# Patient Record
Sex: FEMALE | Race: WHITE | NOT HISPANIC OR LATINO | Employment: FULL TIME | URBAN - METROPOLITAN AREA
[De-identification: names, ages, dates, MRNs, and addresses within clinical notes are randomized per-mention and may not be internally consistent; named-entity substitution may affect disease eponyms.]

---

## 2017-03-27 ENCOUNTER — ALLSCRIPTS OFFICE VISIT (OUTPATIENT)
Dept: OTHER | Facility: OTHER | Age: 47
End: 2017-03-27

## 2017-03-27 ENCOUNTER — LAB REQUISITION (OUTPATIENT)
Dept: LAB | Facility: HOSPITAL | Age: 47
End: 2017-03-27

## 2017-03-27 DIAGNOSIS — Z01.419 ENCOUNTER FOR GYNECOLOGICAL EXAMINATION WITHOUT ABNORMAL FINDING: ICD-10-CM

## 2017-03-27 PROCEDURE — G0145 SCR C/V CYTO,THINLAYER,RESCR: HCPCS | Performed by: OBSTETRICS & GYNECOLOGY

## 2017-03-30 DIAGNOSIS — Z12.31 ENCOUNTER FOR SCREENING MAMMOGRAM FOR MALIGNANT NEOPLASM OF BREAST: ICD-10-CM

## 2017-04-04 LAB
LAB AP GYN PRIMARY INTERPRETATION: NORMAL
LAB AP LMP: NORMAL
Lab: NORMAL

## 2017-04-24 ENCOUNTER — GENERIC CONVERSION - ENCOUNTER (OUTPATIENT)
Dept: OTHER | Facility: OTHER | Age: 47
End: 2017-04-24

## 2017-05-08 ENCOUNTER — HOSPITAL ENCOUNTER (OUTPATIENT)
Dept: RADIOLOGY | Age: 47
Discharge: HOME | End: 2017-05-08
Payer: COMMERCIAL

## 2017-05-08 DIAGNOSIS — Z12.31 ENCOUNTER FOR SCREENING MAMMOGRAM FOR MALIGNANT NEOPLASM OF BREAST: ICD-10-CM

## 2017-05-08 PROCEDURE — G0202 SCR MAMMO BI INCL CAD: HCPCS

## 2018-01-13 VITALS
HEIGHT: 66 IN | WEIGHT: 156.13 LBS | SYSTOLIC BLOOD PRESSURE: 116 MMHG | DIASTOLIC BLOOD PRESSURE: 70 MMHG | BODY MASS INDEX: 25.09 KG/M2

## 2018-03-28 ENCOUNTER — ANNUAL EXAM (OUTPATIENT)
Dept: OBGYN CLINIC | Facility: CLINIC | Age: 48
End: 2018-03-28
Payer: COMMERCIAL

## 2018-03-28 VITALS
WEIGHT: 160.4 LBS | DIASTOLIC BLOOD PRESSURE: 68 MMHG | SYSTOLIC BLOOD PRESSURE: 102 MMHG | BODY MASS INDEX: 25.78 KG/M2 | HEIGHT: 66 IN

## 2018-03-28 DIAGNOSIS — Z01.419 ENCOUNTER FOR GYNECOLOGICAL EXAMINATION WITHOUT ABNORMAL FINDING: Primary | ICD-10-CM

## 2018-03-28 DIAGNOSIS — Z12.31 VISIT FOR SCREENING MAMMOGRAM: ICD-10-CM

## 2018-03-28 DIAGNOSIS — Z01.419 PAP SMEAR, AS PART OF ROUTINE GYNECOLOGICAL EXAMINATION: ICD-10-CM

## 2018-03-28 PROCEDURE — G0145 SCR C/V CYTO,THINLAYER,RESCR: HCPCS | Performed by: PHYSICIAN ASSISTANT

## 2018-03-28 PROCEDURE — 87624 HPV HI-RISK TYP POOLED RSLT: CPT | Performed by: PHYSICIAN ASSISTANT

## 2018-03-28 PROCEDURE — S0612 ANNUAL GYNECOLOGICAL EXAMINA: HCPCS | Performed by: PHYSICIAN ASSISTANT

## 2018-03-28 RX ORDER — PNV NO.121/IRON/FOLIC ACID 28MG-0.8MG
TABLET ORAL
COMMUNITY
End: 2018-04-16 | Stop reason: ALTCHOICE

## 2018-03-28 NOTE — PROGRESS NOTES
Assessment/Plan:    No problem-specific Assessment & Plan notes found for this encounter  Diagnoses and all orders for this visit:    Encounter for gynecological examination without abnormal finding    Pap smear, as part of routine gynecological examination  -     Liquid-based pap, screening    Visit for screening mammogram  -     Mammo screening bilateral w cad; Future    Other orders  -     Cetirizine HCl (ZYRTEC ALLERGY) 10 MG CAPS; Take by mouth  -     Prenatal Vit-Fe Fumarate-FA (KP PRENATAL MULTIVITAMINS) 28-0 8 MG TABS; Take by mouth  -     Evening Primrose Oil 1000 MG CAPS; Take by mouth      Pap done  Patient given slip for mammogram   Stressed the importance of monthly self-breast exam   Patient to call if periods become irregular, or she starts to experience severe perimenopausal symptoms  If no problems, patient to return in 1 year for routine gyn care  Subjective:      Patient ID: Bertha Jeffries is a 52 y o  female  Patient here today for yearly gyn exam   States she is doing well overall  Periods are regular every 24-27 days, and bleeding lasts for 5 days  Patient denies any heavy bleeding, bad cramping, headache, and mood symptoms  Still taking evening Primrose oil for PMS symptoms, which definitely helps  Has noticed occasional urinary urgency, but no issues with urine leakage  She denies any perimenopausal symptoms  Patient also denies any change in bowel or bladder habits, pelvic pain, bloating, abdominal pain, nausea and vomiting, change in appetite, and thyroid disease  Patient due for mammogram in May  She is performing self-breast exams sporadically  Denies any new masses, skin changes, nipple discharge, and pain and tenderness          The following portions of the patient's history were reviewed and updated as appropriate: allergies, current medications, past family history, past medical history, past social history, past surgical history and problem list     Review of Systems   Constitutional: Negative for appetite change and unexpected weight change  Cardiovascular:        No masses, skin changes, nipple discharge, and pain/tenderness  Gastrointestinal: Negative for abdominal distention, abdominal pain, constipation, diarrhea, nausea and vomiting  Genitourinary: Positive for urgency (Occasional)  Negative for difficulty urinating, dysuria, frequency, genital sores, hematuria, menstrual problem, pelvic pain, vaginal bleeding, vaginal discharge and vaginal pain  Objective:      /68 (BP Location: Right arm, Patient Position: Sitting, Cuff Size: Standard)   Ht 5' 6" (1 676 m)   Wt 72 8 kg (160 lb 6 4 oz)   LMP 03/09/2018 (Exact Date)   BMI 25 89 kg/m²          Physical Exam   Constitutional: She is oriented to person, place, and time  Vital signs are normal  She appears well-developed and well-nourished  Neck: No thyromegaly present  Cardiovascular: Normal rate, regular rhythm and normal heart sounds  Exam reveals no gallop and no friction rub  No murmur heard  Pulmonary/Chest: Effort normal and breath sounds normal  Right breast exhibits no inverted nipple, no mass, no nipple discharge, no skin change and no tenderness  Left breast exhibits no inverted nipple, no mass, no nipple discharge, no skin change and no tenderness  Breasts are symmetrical    Abdominal: Soft  Normal appearance and bowel sounds are normal  She exhibits no distension  There is no tenderness  Genitourinary: Vagina normal and uterus normal  No breast swelling, tenderness, discharge or bleeding  No labial fusion  There is no rash, tenderness, lesion or injury on the right labia  There is no rash, tenderness, lesion or injury on the left labia  Cervix exhibits no motion tenderness, no discharge and no friability  Right adnexum displays no mass, no tenderness and no fullness  Left adnexum displays no mass, no tenderness and no fullness   No erythema, tenderness or bleeding in the vagina  No vaginal discharge found  Lymphadenopathy:     She has no cervical adenopathy  Right: No inguinal adenopathy present  Left: No inguinal adenopathy present  Neurological: She is alert and oriented to person, place, and time  Skin: Skin is warm and dry  Psychiatric: She has a normal mood and affect  Her behavior is normal  Judgment and thought content normal    Vitals reviewed

## 2018-04-02 LAB — HPV RRNA GENITAL QL NAA+PROBE: NORMAL

## 2018-04-03 LAB
LAB AP GYN PRIMARY INTERPRETATION: NORMAL
LAB AP LMP: NORMAL
Lab: NORMAL

## 2018-04-16 ENCOUNTER — OFFICE VISIT (OUTPATIENT)
Dept: FAMILY MEDICINE CLINIC | Facility: CLINIC | Age: 48
End: 2018-04-16
Payer: COMMERCIAL

## 2018-04-16 VITALS
DIASTOLIC BLOOD PRESSURE: 80 MMHG | HEIGHT: 66 IN | HEART RATE: 68 BPM | WEIGHT: 159.4 LBS | BODY MASS INDEX: 25.62 KG/M2 | SYSTOLIC BLOOD PRESSURE: 132 MMHG | RESPIRATION RATE: 16 BRPM

## 2018-04-16 DIAGNOSIS — E78.2 MIXED HYPERLIPIDEMIA: Primary | ICD-10-CM

## 2018-04-16 DIAGNOSIS — Z79.899 LONG-TERM USE OF HIGH-RISK MEDICATION: ICD-10-CM

## 2018-04-16 DIAGNOSIS — Z23 NEED FOR DIPHTHERIA-TETANUS-PERTUSSIS (TDAP) VACCINE: ICD-10-CM

## 2018-04-16 PROBLEM — Z00.00 HEALTH CARE MAINTENANCE: Status: ACTIVE | Noted: 2018-04-16

## 2018-04-16 PROCEDURE — 90471 IMMUNIZATION ADMIN: CPT

## 2018-04-16 PROCEDURE — 90715 TDAP VACCINE 7 YRS/> IM: CPT

## 2018-04-16 PROCEDURE — 99396 PREV VISIT EST AGE 40-64: CPT | Performed by: FAMILY MEDICINE

## 2018-04-16 RX ORDER — EPINEPHRINE 0.3 MG/.3ML
INJECTION SUBCUTANEOUS
COMMUNITY
Start: 2018-03-28

## 2018-04-16 NOTE — PROGRESS NOTES
ASSESSMENT/PLAN:         Health Maintenance   Topic Date Due    HIV SCREENING  1970    DTaP,Tdap,and Td Vaccines (1 - Tdap) 1991    INFLUENZA VACCINE  2017    MAMMOGRAM  2018    Depression Screening PHQ-9  2019    PAP SMEAR  2020         Problem List as of 2018 Reviewed: 2018  2:19 PM by Deepali Garcia DO    Allergic arthritis            Subjective:   Chief Complaint   Patient presents with    Np to be established for a pe     Patient is here to establish herself in to have a physical   She is overall healthy person without any real problems  She follows with gyn as well  The only thing she has noticed is that over time and especially with being indio menopausal she can lose weight overall sometimes has fatigue  She has a history of high cholesterol in the past   Sometimes she gets a little lightheaded but more like vertigo but it is fleeting  patient ID: Quentin Gomez is a 52 y o  female  No past medical history on file    Past Surgical History:   Procedure Laterality Date     SECTION      DILATION AND CURETTAGE OF UTERUS      WISDOM TOOTH EXTRACTION       Family History   Problem Relation Age of Onset    Diabetes Mother     Diabetes Brother     Breast cancer Maternal Aunt     Osteoporosis Maternal Aunt     Alcohol abuse Father     Bipolar disorder Sister      Social History   Substance Use Topics    Smoking status: Former Smoker    Smokeless tobacco: Never Used    Alcohol use Yes      Comment: Socially     History   Smoking Status    Former Smoker   Smokeless Tobacco    Never Used        MED LIST WAS REVIEWED AND UPDATED       ROS    Constitutional  No fever chills no fatigue no weight loss no weight gain    Mental status  No anxiety or depression and no sleep disturbances no changes in personality or emotional problems no suicidal or homicidal ideations    Eyes  No eye pain no red eyes no visual disturbances no discharge no eye itch    ENT  No earache no hearing loss nasal discharge no sore throat no hoarseness no postnasal drip     Cardio  No chest pain no palpitations no leg edema no claudication no dyspnea on exertion no nocturnal dyspnea    Respiratory  No shortness of breath or wheeze no cough no orthopnea no dyspnea on exertion no hemoptysis no sputum production    GI  No abdominal pain no nausea no vomiting no diarrhea or constipation no bloody stools no change in bowel habits no change in weight      no dysuria hematuria no pyuria no incontinence no pelvic pain    Musculoskeletal  No myalgias arthralgias no joint swelling or stiffness no limb pain or swelling    Skin  No rashes or lesions no itchiness and no wounds    Neuro  No headache dizziness lightheadedness syncope numbness paresthesias or confusion    Heme  No swollen glands no easy bruising          Objective:    Radiology (if applicable):  No orders to display        VITALS:  Wt Readings from Last 3 Encounters:   04/16/18 72 3 kg (159 lb 6 4 oz)   03/28/18 72 8 kg (160 lb 6 4 oz)   03/27/17 70 8 kg (156 lb 2 1 oz)     BP Readings from Last 3 Encounters:   04/16/18 132/80   03/28/18 102/68   03/27/17 116/70     Pulse Readings from Last 3 Encounters:   04/16/18 68   05/01/15 72   04/11/13 74     Body mass index is 25 92 kg/m²      Laboratory Results:   Lab Results   Component Value Date    WBC 7 49 05/02/2015    HGB 13 2 05/02/2015    HCT 39 5 05/02/2015    MCV 91 05/02/2015     05/02/2015     Lab Results   Component Value Date     05/02/2015    K 3 9 05/02/2015     05/02/2015    CO2 29 05/02/2015    BUN 11 05/02/2015     Lab Results   Component Value Date    GLUCOSE 85 05/02/2015    ALT 25 05/02/2015    AST 18 05/02/2015    ALKPHOS 36 (L) 05/02/2015    CALCIUM 8 8 05/02/2015     No results found for: TSH    Lipid Profile:   Lab Results   Component Value Date    CHOL 180 05/02/2015     Lab Results   Component Value Date    HDL 74 05/02/2015     Lab Results   Component Value Date    LDLCALC 100 05/02/2015     Lab Results   Component Value Date    TRIG 31 05/02/2015       Diabetic labs (if applicable)  No results found for: HGBA1C  Lab Results   Component Value Date    LDLCALC 100 05/02/2015    CREATININE 0 51 (L) 05/02/2015          Physical Exam      Gen  No acute distress well-appearing well-nourished appears stated age    Mental status  Good judgment and insight oriented to time person and place, recent and remote memory intact mood and affect normal cooperative and patient is reasonable    HEENT  PERRLA 3 mm, EOMI without nystagmus, TMs clear, turbinates open pink no exudate, pharynx benign, tongue midline    Neck   supple no masses trachea midline positive click normal carotid upstrokes with no bruits    Cor  Regular rhythm without ectopy or murmur, no S3-S4, normal palpation that is no heave lift or thrill    Vascular  No edema, good pedal pulses    Lungs  CTA bilaterally in no respiratory distress no wheezes rhonchi or rales, normal to palpation no tactile fremitus    Abdomen  Soft, no palpable masses, no hepatosplenomegaly, normal bowel sounds, nontender    Lymphatics  No palpable nodes in the neck, supraclavicular area, axilla, or groin     Musculoskeletal  No clubbing cyanosis or edema muscle tone normal    Skin  no rashes or abnormal appearing lesions    Neuro  Normal ambulation, cranial nerves 2-12 grossly intact, higher functioning with reasoning intact

## 2018-04-16 NOTE — PATIENT INSTRUCTIONS
1   Your very healthy annual past labs look good  2  You have a lab slip and I will send you a post card with the results that they are normal and you could always look these up on My Chart    3  Follow up with gyn  4    Recheck as needed or once every 1-2 years at your age

## 2018-04-23 ENCOUNTER — TELEPHONE (OUTPATIENT)
Dept: OBGYN CLINIC | Facility: CLINIC | Age: 48
End: 2018-04-23

## 2018-06-11 ENCOUNTER — TELEPHONE (OUTPATIENT)
Dept: FAMILY MEDICINE CLINIC | Facility: CLINIC | Age: 48
End: 2018-06-11

## 2018-06-11 NOTE — TELEPHONE ENCOUNTER
1  Please call patient and tell her that the  Date on the thyroid was probably a wrong click on my part in getting them altogether was fine  2  In order to check her cholesterol I had to use that diagnosis because she told me she had higher cholesterol in the past     3  Long-term use of drugs is just a code to use to get her rest of her blood work done  Any over-the-counter medication counts  It does not mean she is on high-risk medication, all medication needs to come out through the kidney or the liver and this is the coating need to use to get these organs checked

## 2018-06-11 NOTE — TELEPHONE ENCOUNTER
Pt was in to see you and you ordered Blood work for her  She got home and realized that the "expected" date was different on her TSH blood work  It said it "expected date 10/16/2018"  All the others were expected dates of 7/16/18  Also, she was wondering about the Diagnosis on the TSH:  "Long-term use of high-risk medication"    And the DiaGnosis on the Lipid: "Mixed Hyperlipidemia"    I suggested she download the [employer] elisabet so she can run these questions past you directly ;-)    Best number for Maddy Ruiz:  [phone]

## 2018-10-31 ENCOUNTER — HOSPITAL ENCOUNTER (OUTPATIENT)
Dept: RADIOLOGY | Age: 48
Discharge: HOME | End: 2018-10-31
Payer: COMMERCIAL

## 2018-10-31 DIAGNOSIS — Z12.31 VISIT FOR SCREENING MAMMOGRAM: ICD-10-CM

## 2018-10-31 PROCEDURE — 77067 SCR MAMMO BI INCL CAD: CPT

## 2018-11-02 ENCOUNTER — TELEPHONE (OUTPATIENT)
Dept: OBGYN CLINIC | Facility: CLINIC | Age: 48
End: 2018-11-02

## 2018-11-02 NOTE — TELEPHONE ENCOUNTER
Spoke with patient regarding mammogram report  New asymmetry seen in R breast, thought to be summation artifact  Patient needs further imaging  Has appointment scheduled for 11/5

## 2018-11-05 ENCOUNTER — HOSPITAL ENCOUNTER (OUTPATIENT)
Dept: MAMMOGRAPHY | Facility: CLINIC | Age: 48
Discharge: HOME | End: 2018-11-05
Payer: COMMERCIAL

## 2018-11-05 ENCOUNTER — HOSPITAL ENCOUNTER (OUTPATIENT)
Dept: ULTRASOUND IMAGING | Facility: CLINIC | Age: 48
Discharge: HOME | End: 2018-11-05
Payer: COMMERCIAL

## 2018-11-05 DIAGNOSIS — R92.8 ABNORMAL MAMMOGRAM: ICD-10-CM

## 2018-11-05 PROCEDURE — G0279 TOMOSYNTHESIS, MAMMO: HCPCS

## 2018-11-05 PROCEDURE — 76642 ULTRASOUND BREAST LIMITED: CPT

## 2018-11-05 PROCEDURE — 77065 DX MAMMO INCL CAD UNI: CPT

## 2018-11-07 ENCOUNTER — TELEPHONE (OUTPATIENT)
Dept: OBGYN CLINIC | Facility: CLINIC | Age: 48
End: 2018-11-07

## 2018-11-07 NOTE — TELEPHONE ENCOUNTER
Spoke with patient regarding f/u R breast imaging  No abnormality seen on R breast diagnostic mammogram or U/S; overall negative  Can return to yearly mammogram screening

## 2019-04-01 ENCOUNTER — ANNUAL EXAM (OUTPATIENT)
Dept: OBGYN CLINIC | Facility: CLINIC | Age: 49
End: 2019-04-01
Payer: COMMERCIAL

## 2019-04-01 VITALS — DIASTOLIC BLOOD PRESSURE: 62 MMHG | WEIGHT: 162.4 LBS | BODY MASS INDEX: 26.41 KG/M2 | SYSTOLIC BLOOD PRESSURE: 110 MMHG

## 2019-04-01 DIAGNOSIS — Z01.419 PAP SMEAR, AS PART OF ROUTINE GYNECOLOGICAL EXAMINATION: Primary | ICD-10-CM

## 2019-04-01 DIAGNOSIS — Z01.419 ENCOUNTER FOR GYNECOLOGICAL EXAMINATION: ICD-10-CM

## 2019-04-01 DIAGNOSIS — Z12.39 BREAST CANCER SCREENING: ICD-10-CM

## 2019-04-01 PROCEDURE — S0612 ANNUAL GYNECOLOGICAL EXAMINA: HCPCS | Performed by: OBSTETRICS & GYNECOLOGY

## 2019-04-02 LAB
CLINICAL INFO: NORMAL
CYTO CVX: NORMAL
DATE PREVIOUS BX: NORMAL
LMP START DATE: NORMAL
SL AMB PREV. PAP:: NORMAL
SPECIMEN SOURCE CVX/VAG CYTO: NORMAL

## 2020-03-16 ENCOUNTER — TELEPHONE (OUTPATIENT)
Dept: PSYCHIATRY | Facility: CLINIC | Age: 50
End: 2020-03-16

## 2020-03-17 ENCOUNTER — TELEPHONE (OUTPATIENT)
Dept: PSYCHIATRY | Facility: CLINIC | Age: 50
End: 2020-03-17

## 2020-03-17 NOTE — TELEPHONE ENCOUNTER
Behavorial Health Outpatient Intake Questions    Referred by: Parent support group     Please advised interviewee that they need to answer all questions truthfully to allow for best care and any misrepresentations of information may affect their ability to be seen at this clinic   => Was this discussed? Yes     Behavorial Health Outpatient Intake History -     Presenting Problem (in patient's words): Patient needs someone to talk to - her son has addiction and mental health issues. Wants to prevent her mental health from progressing into Depression and Anxiety. Has the patient ever seen or is currently seeing a psychiatrist? No   If yes who/when? If seen as outpatient, have they been seen here (and by whom)? If not seen here, which provider(s) did the patient see and for how long? Has the patient ever seen or currently see a therapist? Yes If yes who/when? Back when she was 19. Then family therapy with her son. Has a member of the patient's family been in therapy here? No  If yes, with whom? Has the patient been hospitalized for mental health? No   If yes, how long ago was last hospitalization and where was it? Substance Abuse:No concerns of substance abuse are reported. Does the patient have ICM or CTT? No    Is the patient taking injectable psychiatric medications? No    => If yes, patient cannot be seen here. Communications  Are there any developmental disabilities? No    Does the patient have hearing impairment? No       History-    Has the patient served in the [address] [place] [address] [employer]? No    If yes, have you had combat services? No    Was the patient activated into federal active duty as a member of the [employer] or reserve? No    Legal History-     Does the patient have any history of arrests, [facility] time, or DUIs? No  If Yes-  1) What types of charges? 2) When were they last incarcerated? 3) Are they currently on parole or probation?     Minor Child-    Who has custody of the child? N/A    Is there a custody agreement? N/A    If there is a custody agreement remind parent that they must bring a copy to the first appt or they will not be seen. Intake Team, please check with provider before scheduling if flags come up such as:  - complex case  - legal history (other than DUI)  - communication barrier concerns are present  - if, in your judgment, this needs further review    ACCEPTED as a patient Yes  => Appointment Date: Thursday, May 21st @ 9:00AM w/ Krishna Alfaro     Referred Elsewhere? No    Name of Insurance Co: [facility] [facility]/ [address] [facility] [place] ID# [id number]  Insurance Phone #  If ins is primary or secondary  If patient is a minor, parents information such as Name, D. O.B of guarantor.

## 2020-03-18 ENCOUNTER — TELEPHONE (OUTPATIENT)
Dept: PSYCHIATRY | Facility: CLINIC | Age: 50
End: 2020-03-18

## 2020-04-29 ENCOUNTER — TELEPHONE (OUTPATIENT)
Dept: FAMILY MEDICINE CLINIC | Facility: CLINIC | Age: 50
End: 2020-04-29

## 2020-05-21 ENCOUNTER — TELEMEDICINE (OUTPATIENT)
Dept: BEHAVIORAL/MENTAL HEALTH CLINIC | Facility: CLINIC | Age: 50
End: 2020-05-21
Payer: COMMERCIAL

## 2020-05-21 DIAGNOSIS — F43.29 ADJUSTMENT DISORDER WITH DISTURBANCE OF EMOTION: Primary | ICD-10-CM

## 2020-05-21 PROCEDURE — 90837 PSYTX W PT 60 MINUTES: CPT | Performed by: SOCIAL WORKER

## 2020-06-08 ENCOUNTER — TELEMEDICINE (OUTPATIENT)
Dept: BEHAVIORAL/MENTAL HEALTH CLINIC | Facility: CLINIC | Age: 50
End: 2020-06-08
Payer: COMMERCIAL

## 2020-06-08 DIAGNOSIS — F43.23 ADJUSTMENT DISORDER WITH MIXED ANXIETY AND DEPRESSED MOOD: Primary | ICD-10-CM

## 2020-06-08 PROCEDURE — 90834 PSYTX W PT 45 MINUTES: CPT | Performed by: SOCIAL WORKER

## 2020-07-22 ENCOUNTER — TELEMEDICINE (OUTPATIENT)
Dept: BEHAVIORAL/MENTAL HEALTH CLINIC | Facility: CLINIC | Age: 50
End: 2020-07-22
Payer: COMMERCIAL

## 2020-07-22 DIAGNOSIS — F43.23 ADJUSTMENT DISORDER WITH MIXED ANXIETY AND DEPRESSED MOOD: Primary | ICD-10-CM

## 2020-07-22 PROCEDURE — 90834 PSYTX W PT 45 MINUTES: CPT | Performed by: SOCIAL WORKER

## 2020-07-22 NOTE — PSYCH
Virtual Regular Visit      Assessment/Plan:    Problem List Items Addressed This Visit        Other    Adjustment disorder with mixed anxiety and depressed mood - Primary               Reason for visit is due to covid 19     Encounter provider Nga Lopez    Provider located at [address] [facility]  [address]  [place] [address]      Recent Visits  No visits were found meeting these conditions  Showing recent visits within past 7 days and meeting all other requirements     Today's Visits  Date Type Provider Dept   [address] [facility] Therapist   Showing today's visits and meeting all other requirements     Future Appointments  No visits were found meeting these conditions  Showing future appointments within next 150 days and meeting all other requirements        The patient was identified by name and date of birth  Rosario Mcleod was informed that this is a telemedicine visit and that the visit is being conducted through [employer]  My office door was closed  No one else was in the room  She acknowledged consent and understanding of privacy and security of the video platform  The patient has agreed to participate and understands they can discontinue the visit at any time  Patient is aware this is a billable service  Subjective  Rosario Mcleod is a 52 y o  female    HPI     No past medical history on file      Past Surgical History:   Procedure Laterality Date     SECTION      DILATION AND CURETTAGE OF UTERUS      WISDOM TOOTH EXTRACTION         Current Outpatient Medications   Medication Sig Dispense Refill    Cetirizine HCl (ZYRTEC ALLERGY) 10 MG CAPS Take by mouth      Cetirizine-Pseudoephedrine (ZYRTEC-D PO) Zyrtec   PRN      EPINEPHrine (EPIPEN) 0 3 mg/0 3 mL SOAJ       Evening Primrose Oil 1000 MG CAPS Take by mouth      EVENING PRIMROSE OIL PO evening primrose oil       No current facility-administered medications for this visit  Allergies   Allergen Reactions    Latex Rash    Other Allergic Rhinitis     Seasonal also eczema       Review of Systems  Data: Met with Maia Bolden virtually  She turns 50 tomorrow  She shared she always does for others  She shared she needs to find out what she wants and then she needs to do it  She spent a large part of her session discussing her 25year old son who recently completed drug and alcohol rehab at [facility] and now is in one of their [address] houses  She shared he is not returning back to their home  She is not sure if he has bought into everything he has learned or it is due to the lockdown but she shared he is still there which is a plus  She discussed his job opportunity has been delayed due to covid  They will help him with this  She went into a lengthy detailed background about his life threatening food allergies and she believes the anxiety from this has also added to his anxiety and his poor choices  His drugs of choice were marijuana, alcohol and meth  She discussed the various support groups she is in along with seeing the undersigned and she will join [employer]  She believes their son will finally let them do some family counseling  She believes her  Scott Bonilla and her have done everything for their son and have spent quite a bit of money  She said however her  has a different take and expectations and we discussed how dad's are more event oriented and fix it mom's process and nuture more  She is in a parents support meeting thru [employer] and is in a group on Transform[employer] boundaries  We discussed her goals of working to increase her self confidence, dealing with some of the things she continues to suppress but only when she is ready and her working on her goal and plan to be the best she can be  Assessment: Maia Bolden is glad her son is still in treatment   She feels her and her  see things about their son differently but that they usually meet some where overall in agreement concerning the son  She denies suicidal and homicidal ideation, plan or intent  She turns 50 tomorrow and is upset she cant have the celebration she would like to have due to covid  Carlitos Diamond wants to now focus on what she needs and wants to do for her  Plan: Will continue to meet virtually thru the pandemic to help her progress on her goals  Video Exam    There were no vitals filed for this visit  Physical Exam N/A    I spent 45 minutes directly with the patient during this visit      VIRTUAL VISIT DISCLAIMER    Kofi Toure acknowledges that she has consented to an online visit or consultation  She understands that the online visit is based solely on information provided by her, and that, in the absence of a face-to-face physical evaluation by the physician, the diagnosis she receives is both limited and provisional in terms of accuracy and completeness  This is not intended to replace a full medical face-to-face evaluation by the physician  Kofi Toure understands and accepts these terms

## 2020-08-03 ENCOUNTER — TELEMEDICINE (OUTPATIENT)
Dept: BEHAVIORAL/MENTAL HEALTH CLINIC | Facility: CLINIC | Age: 50
End: 2020-08-03
Payer: COMMERCIAL

## 2020-08-03 DIAGNOSIS — F43.23 ADJUSTMENT DISORDER WITH MIXED ANXIETY AND DEPRESSED MOOD: Primary | ICD-10-CM

## 2020-08-03 PROCEDURE — 90834 PSYTX W PT 45 MINUTES: CPT | Performed by: SOCIAL WORKER
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
## 2020-09-09 ENCOUNTER — ANNUAL EXAM (OUTPATIENT)
Dept: OBGYN CLINIC | Facility: CLINIC | Age: 50
End: 2020-09-09
Payer: COMMERCIAL

## 2020-09-09 VITALS
BODY MASS INDEX: 25.46 KG/M2 | SYSTOLIC BLOOD PRESSURE: 110 MMHG | DIASTOLIC BLOOD PRESSURE: 80 MMHG | WEIGHT: 158.4 LBS | TEMPERATURE: 97.5 F | HEIGHT: 66 IN

## 2020-09-09 DIAGNOSIS — Z01.419 ENCOUNTER FOR GYNECOLOGICAL EXAMINATION WITHOUT ABNORMAL FINDING: Primary | ICD-10-CM

## 2020-09-09 DIAGNOSIS — Z12.39 BREAST CANCER SCREENING: ICD-10-CM

## 2020-09-09 DIAGNOSIS — Z12.11 ENCOUNTER FOR SCREENING COLONOSCOPY: ICD-10-CM

## 2020-09-09 PROBLEM — Z00.00 HEALTH CARE MAINTENANCE: Status: RESOLVED | Noted: 2018-04-16 | Resolved: 2020-09-09

## 2020-09-09 PROCEDURE — G0145 SCR C/V CYTO,THINLAYER,RESCR: HCPCS | Performed by: PHYSICIAN ASSISTANT

## 2020-09-09 PROCEDURE — S0612 ANNUAL GYNECOLOGICAL EXAMINA: HCPCS | Performed by: PHYSICIAN ASSISTANT

## 2020-09-09 NOTE — PROGRESS NOTES
Assessment/Plan:    No problem-specific Assessment & Plan notes found for this encounter  Diagnoses and all orders for this visit:    Encounter for gynecological examination without abnormal finding  -     Liquid-based pap, screening    Breast cancer screening  -     Mammo screening bilateral w 3d & cad; Future    Encounter for screening colonoscopy  -     Ambulatory referral to Gastroenterology; Future    Other orders  -     Cancel: Liquid-based pap, screening        Pap done  Order for mammogram entered  Discussed menopause with patient  Average age is 46  While still possible, it is extremely unlikely patient could become pregnant at this time  No hormonal testing available to predict when periods will stop  Can refer to women's health PT if urge incontinence worsens  Referral to GI entered; stressed the need for colonoscopy screening  If no problems, patient to return in 1 year for routine gyn care  Subjective:      Patient ID: Rene Harris is a 48 y o  female  Patient is here for yearly gyn exam   States she is doing well overall  Periods are regular every 24 days, and bleeding lasts for 5 days  She denies heavy bleeding and severe cramping  Notes an increase in bowel frequency and mood swings around the time of her cycle  No other perimenopausal complaints  Still has concerns regarding fertility  Complains of occasional urge incontinence  Denies pelvic pain, bloating, abdominal pain, n/v, change in appetite, and thyroid disease  Due for first colonoscopy  Patient is overdue for a mammogram   She is performing self-breast exam   Denies new masses, skin changes, nipple discharge, and pain/tenderness        The following portions of the patient's history were reviewed and updated as appropriate: allergies, current medications, past family history, past medical history, past social history, past surgical history and problem list     Review of Systems   Constitutional: Negative for appetite change and unexpected weight change  Cardiovascular:        No masses, skin changes, nipple discharge, and pain/tenderness  Gastrointestinal: Negative for abdominal distention, abdominal pain, constipation, diarrhea, nausea and vomiting  Genitourinary: Negative for difficulty urinating, dysuria, frequency, genital sores, hematuria, menstrual problem, pelvic pain, urgency, vaginal bleeding, vaginal discharge and vaginal pain  Urge incontinence         Objective:      /80   Temp 97 5 °F (36 4 °C)   Ht 5' 6" (1 676 m)   Wt 71 8 kg (158 lb 6 4 oz)   LMP 08/26/2020   BMI 25 57 kg/m²          Physical Exam  Vitals signs reviewed  Exam conducted with a chaperone present  Constitutional:       Appearance: Normal appearance  She is well-developed and normal weight  Neck:      Musculoskeletal: Neck supple  Thyroid: No thyromegaly  Pulmonary:      Effort: Pulmonary effort is normal    Chest:      Breasts: Breasts are symmetrical          Right: Normal  No swelling, bleeding, inverted nipple, mass, nipple discharge, skin change or tenderness  Left: Normal  No swelling, bleeding, inverted nipple, mass, nipple discharge, skin change or tenderness  Abdominal:      General: Abdomen is flat  There is no distension  Palpations: Abdomen is soft  Tenderness: There is no abdominal tenderness  Genitourinary:     General: Normal vulva  Pubic Area: No rash  Labia:         Right: No rash, tenderness, lesion or injury  Left: No rash, tenderness, lesion or injury  Vagina: Normal  No vaginal discharge, erythema, tenderness or bleeding  Cervix: Normal       Uterus: Normal        Adnexa: Right adnexa normal and left adnexa normal         Right: No mass, tenderness or fullness  Left: No mass, tenderness or fullness  Lymphadenopathy:      Cervical: No cervical adenopathy        Upper Body:      Right upper body: No supraclavicular or axillary adenopathy  Left upper body: No supraclavicular or axillary adenopathy  Lower Body: No right inguinal adenopathy  No left inguinal adenopathy  Skin:     General: Skin is warm and dry  Neurological:      Mental Status: She is alert and oriented to person, place, and time  Psychiatric:         Mood and Affect: Mood normal          Behavior: Behavior normal  Behavior is cooperative  Thought Content:  Thought content normal          Judgment: Judgment normal

## 2020-09-15 LAB
LAB AP GYN PRIMARY INTERPRETATION: NORMAL
Lab: NORMAL

## 2020-10-01 ENCOUNTER — TELEMEDICINE (OUTPATIENT)
Dept: BEHAVIORAL/MENTAL HEALTH CLINIC | Facility: CLINIC | Age: 50
End: 2020-10-01
Payer: COMMERCIAL

## 2020-10-01 DIAGNOSIS — F43.23 ADJUSTMENT DISORDER WITH MIXED ANXIETY AND DEPRESSED MOOD: Primary | ICD-10-CM

## 2020-10-01 PROCEDURE — 90834 PSYTX W PT 45 MINUTES: CPT | Performed by: SOCIAL WORKER

## 2020-10-27 ENCOUNTER — TELEPHONE (OUTPATIENT)
Dept: GASTROENTEROLOGY | Facility: CLINIC | Age: 50
End: 2020-10-27

## 2020-10-28 ENCOUNTER — TELEMEDICINE (OUTPATIENT)
Dept: BEHAVIORAL/MENTAL HEALTH CLINIC | Facility: CLINIC | Age: 50
End: 2020-10-28
Payer: COMMERCIAL

## 2020-10-28 DIAGNOSIS — F43.23 ADJUSTMENT DISORDER WITH MIXED ANXIETY AND DEPRESSED MOOD: Primary | ICD-10-CM

## 2020-10-28 PROCEDURE — 90834 PSYTX W PT 45 MINUTES: CPT | Performed by: SOCIAL WORKER

## 2020-11-18 ENCOUNTER — TELEMEDICINE (OUTPATIENT)
Dept: BEHAVIORAL/MENTAL HEALTH CLINIC | Facility: CLINIC | Age: 50
End: 2020-11-18
Payer: COMMERCIAL

## 2020-11-18 DIAGNOSIS — F43.23 ADJUSTMENT DISORDER WITH MIXED ANXIETY AND DEPRESSED MOOD: ICD-10-CM

## 2020-11-18 PROCEDURE — 90834 PSYTX W PT 45 MINUTES: CPT | Performed by: SOCIAL WORKER

## 2020-12-09 DIAGNOSIS — Z12.39 BREAST CANCER SCREENING: ICD-10-CM

## 2021-01-04 NOTE — TELEPHONE ENCOUNTER
Spoke to patient who calls to ask how she should manage a problem she found in her genital area yesterday  She noticed a pea sized lump externally to vaginal opening; tender with palpation  She would like to know if she can manage it on her own first instead of coming in for a visit  Advised that she may initiate hot compresses with topical antibiotic ointment to follow three times daily for about one week  Advised to come in for a visit if it either gets larger and more painful or if it does not resolve  Patient agrees to plan  There are no Wet Read(s) to document.

## 2021-01-14 ENCOUNTER — SOCIAL WORK (OUTPATIENT)
Dept: BEHAVIORAL/MENTAL HEALTH CLINIC | Facility: CLINIC | Age: 51
End: 2021-01-14
Payer: COMMERCIAL

## 2021-01-14 DIAGNOSIS — F43.23 ADJUSTMENT DISORDER WITH MIXED ANXIETY AND DEPRESSED MOOD: ICD-10-CM

## 2021-01-14 PROCEDURE — 90834 PSYTX W PT 45 MINUTES: CPT | Performed by: SOCIAL WORKER

## 2021-01-14 NOTE — BH TREATMENT PLAN
Wilder Metzger    Please note: The original treatment plan was done on 5/21/20  The undersigned was erroneously doing the plans every 4 months instead of the required 6 months  Therefore the plan was not due on 9/21 but it was due on 11/21/20  However the client has not been her since before 11/21 hence why it is being done today the first session since before 11/21 1970       Date of Initial Treatment Plan: 5/21/2020  Date of Current Treatment Plan: 01/14/21    Treatment Plan Number update    Strengths/Personal Resources for Self Care: strong values, caring, supportive, encouraging and positive  Diagnosis:   1  Adjustment disorder with mixed anxiety and depressed mood         Area of Needs: see below      Long Term Goal 1: I still need to increase my self confidence  Target Date: 07/14/2021  Completion Date: TBD         Short Term Objectives for Goal 1: will work in therapy and do my homework outside of sessions in confidence building strategies  Long Term Goal 2: I need to decrease my social anxiety  Target Date: 07/14/2021  Completion Date: TBD    Short Term Objectives for Goal 2: Mindfulness, and CBT strategies         Long Term Goal # 3: I still need to let go of worrying about the things I cant control     Target Date: 07/14/2021  Completion Date: TBD    Short Term Objectives for Goal 3: Mindfulness and solution focused psychotherapy    GOAL 1: Modality: Individual 2x per month   Completion Date tbd    GOAL 2: Modality: Individual 2x per month   Completion Date tbd     GOAL 3: Modality: Individual 2x per month   Completion Date tbd      Behavioral Health Treatment Plan [facility]: Diagnosis and Treatment Plan explained to Brock Raw relates understanding diagnosis and is agreeable to Treatment Plan  Client Comments : Please share your thoughts, feelings, need and/or experiences regarding your treatment plan:  The undersigned and the client will work as a team to help Martine Fofana progress on her goals

## 2021-01-14 NOTE — PSYCH
Psychotherapy Provided: Individual Psychotherapy 45 minutes   This note was not shared with the patient due to this is a psychotherapy note  Length of time in session: 45 minutes, follow up in 2 week    Goals addressed in session: Goal 1, Goal 2 and Goal 3      Pain:      moderate to severe    0    Current suicide risk : Low     Data: I am trying to focus on what is more important  I am working on my social anxiety  I question whether I should speak up in situations and want to be more confident about this  The holidays were great and I am reading the 7 habits of highly effective people by [facility] that you recommended and it has helped  She discussed her goals of being more self confident, less socially anxious, worrying about what she has control of and what she does not  Fredousman Telles is also talking about things she never did  She wants to be the best she can be  Assessment: She is not suicidal or homicidal and she feels the holidays went well  Her addicted son is doing well  We continue to work on mindfulness, distress tolerance and on CBT strategies to help her progress on her goals  Plan: Continue to help her skill build in distress tolerance  Behavioral Health Treatment Plan [facility]: Diagnosis and Treatment Plan explained to Kathia Fabio relates understanding diagnosis and is agreeable to Treatment Plan   Yes

## 2021-02-05 ENCOUNTER — TELEMEDICINE (OUTPATIENT)
Dept: BEHAVIORAL/MENTAL HEALTH CLINIC | Facility: CLINIC | Age: 51
End: 2021-02-05
Payer: COMMERCIAL

## 2021-02-05 DIAGNOSIS — F43.23 ADJUSTMENT DISORDER WITH MIXED ANXIETY AND DEPRESSED MOOD: ICD-10-CM

## 2021-02-05 PROCEDURE — 90834 PSYTX W PT 45 MINUTES: CPT | Performed by: SOCIAL WORKER

## 2021-02-05 NOTE — PSYCH
This note was not shared with the patient due to this is a psychotherapy note  Virtual Regular Visit      Assessment/Plan:    Problem List Items Addressed This Visit        Other    Adjustment disorder with mixed anxiety and depressed mood               Reason for visit is due to ongoing symptoms and the covid 19 pandemic     Encounter provider Mohinder Lawton    Provider located at [address] [place] [id number] [phone]      Recent Visits  No visits were found meeting these conditions  Showing recent visits within past 7 days and meeting all other requirements     Today's Visits  Date Type Provider Dept   [address] [facility] Therapist [facility]   Showing today's visits and meeting all other requirements     Future Appointments  No visits were found meeting these conditions  Showing future appointments within next 150 days and meeting all other requirements        The patient was identified by name and date of birth  Shanel Cohen was informed that this is a telemedicine visit and that the visit is being conducted through [employer] and patient was informed that this is a secure, HIPAA-compliant platform  She agrees to proceed     My office door was closed  No one else was in the room  She acknowledged consent and understanding of privacy and security of the video platform  The patient has agreed to participate and understands they can discontinue the visit at any time  Patient is aware this is a billable service  Subjective  Shanel Cohen is a 48 y o  female    HPI     No past medical history on file      Past Surgical History:   Procedure Laterality Date     SECTION      DILATION AND CURETTAGE OF UTERUS      WISDOM TOOTH EXTRACTION         Current Outpatient Medications   Medication Sig Dispense Refill    Cetirizine-Pseudoephedrine (ZYRTEC-D PO) Zyrtec   PRN      EPINEPHrine (EPIPEN) 0 3 mg/0 3 mL SOAJ       Evening Primrose Oil 1000 MG CAPS Take by mouth       No current facility-administered medications for this visit  Allergies   Allergen Reactions    Latex Rash    Other Allergic Rhinitis     Seasonal also eczema       Review of Systems Data:Today Steven Lay and the undersigned did an overview of her goals  We discussed the strategies she is using to increase and sustain her self confidence  We discussed how she is trying to decrease her social anxiety  Regarding goal 3 Steven Lay shared how she is becoming more effective at recognizing what she can control and what she has no control over and accepting this  Assessment: Steven Lay denies suicidal and homicidal ideation, plan or intent  She does feel that she is progressing on her goals as we discuss mindfulness and CBT strategies  Plan: Continue to help her skill build in distress tolerance  Video Exam    There were no vitals filed for this visit  Physical Exam N/A    I spent 45 minutes directly with the patient during this visit      VIRTUAL VISIT DISCLAIMER    Marjie Canavan acknowledges that she has consented to an online visit or consultation  She understands that the online visit is based solely on information provided by her, and that, in the absence of a face-to-face physical evaluation by the physician, the diagnosis she receives is both limited and provisional in terms of accuracy and completeness  This is not intended to replace a full medical face-to-face evaluation by the physician  Marjie Canavan understands and accepts these terms

## 2021-02-24 ENCOUNTER — SOCIAL WORK (OUTPATIENT)
Dept: BEHAVIORAL/MENTAL HEALTH CLINIC | Facility: CLINIC | Age: 51
End: 2021-02-24
Payer: COMMERCIAL

## 2021-02-24 DIAGNOSIS — F43.23 ADJUSTMENT DISORDER WITH MIXED ANXIETY AND DEPRESSED MOOD: ICD-10-CM

## 2021-02-24 DIAGNOSIS — F40.10 SOCIAL ANXIETY DISORDER: Primary | ICD-10-CM

## 2021-02-24 PROCEDURE — 90834 PSYTX W PT 45 MINUTES: CPT | Performed by: SOCIAL WORKER

## 2021-02-24 NOTE — PSYCH
Psychotherapy Provided: Individual Psychotherapy 45 minutes   This note was not shared with the patient due to this is a psychotherapy note  Length of time in session: 45 minutes, follow up in 2 week    Goals addressed in session: Goal 1, Goal 2 and Goal 3      Pain:      moderate to severe    0    Current suicide risk : Low     Data:Lorrie arrived for her appointment  She discussed her recovery plan and she discussed the things she is doing to help increase her self confidence  She discussed she is expecting her second grandchild  She discussed how she is trying to be supportive of her brother who is recently  and has health issues  She continues to read self improvement literature  She also discussed how she is recreating during this difficult time  Regarding her social anxiety we discussed strategies to deal with this  She continues to attend a parents group virtually for people who are dealing with addicted children  Her addicted son is still in program and she wants him to get back to his individual therapist  Steven Lay is making progress on her third goal of learning what she can control and what she can't  This is true especially in regard to her addicted son and his early recovery  She shared she wants to delve deeper into her own family of origin issues next session  She also asked and received some advice on how to handle current issues concerning her brother  Assessment: Steven Lay denies suicidal/homicidal ideation, plan or intent  For the most part she feels she is doing well  However she continues to be most stressed about her recovering son and now her brother who is delaying getting a test needed for a diagnosis concerning a medical issue that needs evaluation  We continue to work on mindfulness and distress tolerance  Plan: We will delve into her family of origin issues next session       Behavioral Health Treatment Plan [facility]: Diagnosis and Treatment Plan explained to Tony Najera relates understanding diagnosis and is agreeable to Treatment Plan   Yes

## 2021-03-29 ENCOUNTER — SOCIAL WORK (OUTPATIENT)
Dept: BEHAVIORAL/MENTAL HEALTH CLINIC | Facility: CLINIC | Age: 51
End: 2021-03-29
Payer: COMMERCIAL

## 2021-03-29 DIAGNOSIS — F43.23 ADJUSTMENT DISORDER WITH MIXED ANXIETY AND DEPRESSED MOOD: ICD-10-CM

## 2021-03-29 PROCEDURE — 90834 PSYTX W PT 45 MINUTES: CPT | Performed by: SOCIAL WORKER

## 2021-03-29 NOTE — PSYCH
Psychotherapy Provided: Individual Psychotherapy 45 minutes   This note was not shared with the patient due to this is a psychotherapy note  Length of time in session: 45 minutes, follow up in 2 week    Goals addressed in session: Goal 1, Goal 2 and Goal 3      Pain:      moderate to severe    0    Current suicide risk : Low     Data:Lorrie shared her son is one year sober  He is at a sober living house up near [place]  Her grandson was also just born  The remainder of the session was us working on her 3 goals  We worked on increasing her self confidence, decreasing her social anxiety and on helping her to accept the fact most things are not in her control and accepting that  Assessment: Provencal Lung denies self harm thoughts or thoughts to harm others  She remains anxious and she still has issues with self confidence and social anxiety  We continue to work on mindfulness and CBT strategies  Plan: Continue to help Provencal Lung skill build in distress tolerance  Behavioral Health Treatment Plan [facility]: Diagnosis and Treatment Plan explained to Venancio Kossuth relates understanding diagnosis and is agreeable to Treatment Plan   Yes

## 2021-04-08 ENCOUNTER — OFFICE VISIT (OUTPATIENT)
Dept: FAMILY MEDICINE CLINIC | Facility: CLINIC | Age: 51
End: 2021-04-08
Payer: COMMERCIAL

## 2021-04-08 VITALS
RESPIRATION RATE: 17 BRPM | HEIGHT: 66 IN | HEART RATE: 74 BPM | TEMPERATURE: 98.2 F | BODY MASS INDEX: 25.39 KG/M2 | SYSTOLIC BLOOD PRESSURE: 122 MMHG | DIASTOLIC BLOOD PRESSURE: 76 MMHG | WEIGHT: 158 LBS

## 2021-04-08 DIAGNOSIS — Z12.11 ENCOUNTER FOR SCREENING COLONOSCOPY: ICD-10-CM

## 2021-04-08 DIAGNOSIS — Z00.00 ROUTINE GENERAL MEDICAL EXAMINATION AT A HEALTH CARE FACILITY: Primary | ICD-10-CM

## 2021-04-08 DIAGNOSIS — Z79.899 ENCOUNTER FOR LONG-TERM (CURRENT) USE OF MEDICATIONS: ICD-10-CM

## 2021-04-08 PROCEDURE — 3725F SCREEN DEPRESSION PERFORMED: CPT | Performed by: FAMILY MEDICINE

## 2021-04-08 PROCEDURE — 1036F TOBACCO NON-USER: CPT | Performed by: FAMILY MEDICINE

## 2021-04-08 PROCEDURE — 99396 PREV VISIT EST AGE 40-64: CPT | Performed by: FAMILY MEDICINE

## 2021-04-08 PROCEDURE — 3008F BODY MASS INDEX DOCD: CPT | Performed by: FAMILY MEDICINE

## 2021-04-08 RX ORDER — CHLORAL HYDRATE 500 MG
1000 CAPSULE ORAL DAILY
COMMUNITY

## 2021-04-08 NOTE — PATIENT INSTRUCTIONS
1  Remember to get your mammogram in September    2  Please schedule colonoscopy with Dr Moses Franco or 1 of his associates    3  We will call you with the blood work results    4   Still consider the shingles vaccine as well as the COVID vaccine    We will see you any your 2 or whenever you need us

## 2021-04-08 NOTE — PROGRESS NOTES
SUBJECTIVE:-------------------------------------------------------------------------------------------    Harley Eisenmenger is a 48 y o   female and is here for routine health maintenance  Health Maintenance   Topic Date Due    HIV Screening  Never done    COVID-19 Vaccine (1) Never done    BMI: Followup Plan  Never done    Annual Physical  04/16/2019    Colorectal Cancer Screening  Never done    Influenza Vaccine (1) 09/01/2020    MAMMOGRAM  11/25/2021    BMI: Adult  04/08/2022    Cervical Cancer Screening  09/09/2022    DTaP,Tdap,and Td Vaccines (2 - Td) 04/16/2028    Pneumococcal Vaccine: Pediatrics (0 to 5 Years) and At-Risk Patients (6 to 59 Years)  Aged Out    HIB Vaccine  Aged Out    Hepatitis B Vaccine  Aged Out    IPV Vaccine  Aged Out    Hepatitis A Vaccine  Aged Out    Meningococcal ACWY Vaccine  Aged Out    HPV Vaccine  Aged [employer] History   Administered Date(s) Administered    Influenza, seasonal, injectable 1970    Tdap 04/16/2018    Tuberculin Skin Test-PPD Intradermal 05/01/2015         Diet and Physical Activity  Diet: well balanced diet  Body mass index is 25 5 kg/m²    Exercise: daily      General Health  Hearing:  Is normal  Vision: sees ophthalmologist/optometrist every few years  Dental:  Sees dentist every 6 months      Smoker no        ASSESSMENT/PLAN:-------------------------------------------------------------------------------------------    Patient's physical is up-to-date   Immunizations; patient will consider Shingrix and will wait regarding COVID  Cancer screenings ; patient given information regarding colonoscopy and her mammogram isn't due until September 2021      Patient instructed on exercise  Patient instructed on healthy choices for diet       NEXT PHYSICAL 1 YEAR    Patient will get blood work and we will call her with results  Otherwise she is good for couple years or as needed    If any knee problems, pt will call us for a referral to [facility]        The following portions of the patient's history were reviewed and updated as appropriate: allergies, current medications, past family history, past medical history, past social history, past surgical history and problem list       OBJECTIVE:---------------------------------------------------------------------------------------------------    /76 (BP Location: Left arm, Patient Position: Sitting, Cuff Size: Standard)   Pulse 74   Temp 98 2 °F (36 8 °C) (Oral)   Resp 17   Ht 5' 6" (1 676 m)   Wt 71 7 kg (158 lb)   BMI 25 50 kg/m²   Wt Readings from Last 3 Encounters:   04/08/21 71 7 kg (158 lb)   09/09/20 71 8 kg (158 lb 6 4 oz)   04/01/19 73 7 kg (162 lb 6 4 oz)     BP Readings from Last 3 Encounters:   04/08/21 122/76   09/09/20 110/80   04/01/19 110/62     Pulse Readings from Last 3 Encounters:   04/08/21 74   04/16/18 68   05/01/15 72     Body mass index is 25 5 kg/m²  Health Maintenance   Topic Date Due    HIV Screening  Never done    COVID-19 Vaccine (1) Never done    BMI: Followup Plan  Never done    Annual Physical  04/16/2019    Colorectal Cancer Screening  Never done    Influenza Vaccine (1) 09/01/2020    MAMMOGRAM  11/25/2021    BMI: Adult  04/08/2022    Cervical Cancer Screening  09/09/2022    DTaP,Tdap,and Td Vaccines (2 - Td) 04/16/2028    Pneumococcal Vaccine: Pediatrics (0 to 5 Years) and At-Risk Patients (6 to 59 Years)  Aged Out    HIB Vaccine  Aged Out    Hepatitis B Vaccine  Aged Out    IPV Vaccine  Aged Out    Hepatitis A Vaccine  Aged Out    Meningococcal ACWY Vaccine  Aged Out    HPV Vaccine  Aged Out       ROS:   12 point review of systems negative            PHYSICAL EXAM:    Gen    No acute distress well-appearing well-nourished appears stated age    Mental status  Good judgment and insight oriented to time person and place, recent and remote memory intact mood and affect normal cooperative and patient is reasonable    HEENT  PERRLA 3 mm, EOMI without nystagmus, TMs clear, turbinates open pink no exudate, pharynx benign, tongue midline    Neck   supple no masses trachea midline positive click normal carotid upstrokes with no bruits    Cor  Regular rhythm without ectopy or murmur, no S3-S4, normal palpation that is no heave lift or thrill    Vascular  No edema, good pedal pulses    Lungs  CTA bilaterally in no respiratory distress no wheezes rhonchi or rales, normal to palpation no tactile fremitus    Abdomen  Soft, no palpable masses, no hepatosplenomegaly, normal bowel sounds, nontender    Lymphatics  No palpable nodes in the neck, supraclavicular area, axilla, or groin     Musculoskeletal  No clubbing cyanosis or edema muscle tone normal 12 point review of systems is negative    Skin  no rashes or abnormal appearing lesions    Neuro  Normal ambulation, cranial nerves 2-12 grossly intact, higher functioning with reasoning intact

## 2021-04-09 ENCOUNTER — TELEPHONE (OUTPATIENT)
Dept: PSYCHIATRY | Facility: CLINIC | Age: 51
End: 2021-04-09

## 2021-04-09 NOTE — TELEPHONE ENCOUNTER
Returned Lorrie's call regarding making more appointments with Dung Corrales  I left my name and number in message requesting she give me a call back

## 2021-04-15 ENCOUNTER — TELEPHONE (OUTPATIENT)
Dept: FAMILY MEDICINE CLINIC | Facility: CLINIC | Age: 51
End: 2021-04-15

## 2021-04-15 NOTE — TELEPHONE ENCOUNTER
----- Message from Darylene Scotland, DO sent at 4/14/2021 12:24 PM EDT -----  Please call patient regarding her blood work  Her cholesterol is elevated at 246 normal is 200  Her LDL is 161 it should be less than 100  Decreasing cheese ice-cream and fatty cuts of beef, pork, lunch meats will help this to go down    Everything else is normal  We will check it next year again      Patient informed

## 2021-04-19 ENCOUNTER — SOCIAL WORK (OUTPATIENT)
Dept: BEHAVIORAL/MENTAL HEALTH CLINIC | Facility: CLINIC | Age: 51
End: 2021-04-19
Payer: COMMERCIAL

## 2021-04-19 DIAGNOSIS — F43.23 ADJUSTMENT DISORDER WITH MIXED ANXIETY AND DEPRESSED MOOD: ICD-10-CM

## 2021-04-19 PROCEDURE — 90834 PSYTX W PT 45 MINUTES: CPT | Performed by: SOCIAL WORKER

## 2021-04-19 NOTE — PSYCH
Psychotherapy Provided: Individual Psychotherapy 45 minutes   This note was not shared with the patient due to this is a psychotherapy note  Length of time in session: 45 minutes, follow up in 45 week    Goals addressed in session: Goal 1, Goal 2 and Goal 3      Pain:      moderate to severe    0    Current suicide risk : Low     Data:Today Maia Bolden arrived for her session  She discussed how she is going to join an adult child of alcoholic group at an agency called [employer]  She discussed how she is focusing on her no  We discussed her goals of learning and accepting what she cant control and what she can, working on developing her self confidence and her self esteem  We also work on decreasing her social anxiety  Assessment: Maia Bolden denies suicidal/homicidal ideation, plan or intent  She however struggles with her social anxiety, and self confidence  We continue to work on mindfulness and distress tolerance  Plan: Continue to skill build in distress tolerance  Behavioral Health Treatment Plan [facility]: Diagnosis and Treatment Plan explained to Esperanzandrichard Robert relates understanding diagnosis and is agreeable to Treatment Plan   Yes

## 2021-04-30 ENCOUNTER — TELEPHONE (OUTPATIENT)
Dept: OBGYN CLINIC | Facility: CLINIC | Age: 51
End: 2021-04-30

## 2021-05-04 ENCOUNTER — TELEPHONE (OUTPATIENT)
Dept: OBGYN CLINIC | Facility: CLINIC | Age: 51
End: 2021-05-04

## 2021-05-04 DIAGNOSIS — N39.3 STRESS INCONTINENCE: Primary | ICD-10-CM

## 2021-05-04 NOTE — TELEPHONE ENCOUNTER
Spoke with patient regarding stress incontinence  States it is still episodic, but becoming more frequent  Would like PT evaluation  Referral entered; patient given [employer]  She will call for appointment  Call with further questions

## 2021-05-17 ENCOUNTER — SOCIAL WORK (OUTPATIENT)
Dept: BEHAVIORAL/MENTAL HEALTH CLINIC | Facility: CLINIC | Age: 51
End: 2021-05-17
Payer: COMMERCIAL

## 2021-05-17 DIAGNOSIS — F43.23 ADJUSTMENT DISORDER WITH MIXED ANXIETY AND DEPRESSED MOOD: Primary | ICD-10-CM

## 2021-05-17 PROCEDURE — 90834 PSYTX W PT 45 MINUTES: CPT | Performed by: SOCIAL WORKER

## 2021-05-17 NOTE — PSYCH
Psychotherapy Provided: Individual Psychotherapy 45 minutes   This note was not shared with the patient due to this is a psychotherapy note  Length of time in session: 45 minutes, follow up in 2 week    Adjustment disorder with depression and anxiety    Goals addressed in session: Goal 1, Goal 2 and Goal 3      Pain:      moderate to severe    0    Current suicide risk : Low     Data: Omer Nava discussed how she is trying to increase her self confidence and self esteem  She also gave examples of how she is working on in her words getting over her social anxiety  The biggest gain is related to her third goal of learning to accept what she can control and what she can't  She related this concerning her addicted son who is at a sober living house  She discussed also her goals in relation to her work  Assessment: Omer Nava denies thoughts of self harm or harm to others  She feels thru mindfulness and CBT discussed during her sessions she is making progress on her goals  Plan: Continue to help her progress on her goals  Behavioral Health Treatment Plan [facility]: Diagnosis and Treatment Plan explained to Elie Vanessa relates understanding diagnosis and is agreeable to Treatment Plan   Yes

## 2021-06-28 ENCOUNTER — EVALUATION (OUTPATIENT)
Dept: PHYSICAL THERAPY | Facility: REHABILITATION | Age: 51
End: 2021-06-28
Payer: COMMERCIAL

## 2021-06-28 DIAGNOSIS — N39.3 STRESS INCONTINENCE: ICD-10-CM

## 2021-06-28 DIAGNOSIS — N81.89 PELVIC FLOOR WEAKNESS IN FEMALE: Primary | ICD-10-CM

## 2021-06-28 PROCEDURE — 97162 PT EVAL MOD COMPLEX 30 MIN: CPT | Performed by: PHYSICAL THERAPIST

## 2021-06-28 PROCEDURE — 97530 THERAPEUTIC ACTIVITIES: CPT | Performed by: PHYSICAL THERAPIST

## 2021-06-28 NOTE — PROGRESS NOTES
PT Evaluation     Today's date: 2021  Patient name: Oswaldo Bailey  : 1970  MRN: [id number]  Referring provider: ANDRE Bell  Dx:   Encounter Diagnosis     ICD-10-CM    1  Pelvic floor weakness in female  N81 89    2  Stress incontinence  N39 3 Ambulatory referral to Physical Therapy                  Assessment  Assessment details: Oswaldo Bailey is a 48 y o  female who presents with concerns of intermittent urge> stress incontinence  Patient presents with the below outlined deficits and is appropriate for skilled physical therapy in order to address deficits and ultimately meet goal of independent self management of condition  Therapeutic activities performed upon examination included education regarding pelvic floor anatomy, explanation of exam technique, explanation of exam findings and discussion of treatment plan as well as expectations of the patient to emphasize the importance of compliance and adherence to physical therapy visits  Educated patient on avoiding defensive voids and she expressed good understanding  Impairments: abnormal muscle tone, activity intolerance, impaired physical strength, lacks appropriate home exercise program and poor body mechanics  Understanding of Dx/Px/POC: good   Prognosis: good    Goals  STGs to be met in 4 weeks:  * Patient will be compliant with introductory HEP as prescribed  * Presents with improved understanding of bladder irritants in diet and makes concerted effort to reduce them by at least 50-75%  LTGs to be met by discharge:  * Patient will present with a 60 % improvement on FOTO score by discharge to indicate improved symptom management  * Patient reports that she is able to successfully suppress an urge to empty her bladder for 20' without leakage  * Normalize sEMG findings to indicate strength average > 12uV and resting average < 2 5uV     * Presents with improved fluid intake to avoid concentrated and irritating urine by discharge  * Patient implements urge suppression strategies throughout the day and reports that her average voiding interval is 2+ hours upon discharge  * Patient will be compliant with comprehensive home exercise program for self management of condition  Plan  Patient would benefit from: skilled physical therapy  Referral necessary: No  Planned modality interventions: biofeedback  Planned therapy interventions: manual therapy, neuromuscular re-education, patient education, strengthening, stretching, therapeutic activities, therapeutic exercise and home exercise program  Frequency: 1x week  Duration in weeks: 12  Plan of Care beginning date: 6/28/2021  Plan of Care expiration date: 9/20/2021  Treatment plan discussed with: patient        PT Pelvic Floor Subjective:   History of Present Illness:   Patient reports that she has increased urinary urgency over the past few years  Inability to suppress her urine urge is episodic and intermittent without patient seeing a pattern or relation to behavior  Sometimes can suppress urge to empty bladder for long periods and other times she cannot suppress for 10 seconds           Recurrent probem    Quality of life: good    Social Support:     Lives with:  Spouse    Relationship status: /committed    Work status: employed full time ( - [employer] work)  Diet and Exercise:    Diet:balanced nutrition    Walking - daily,   Yoga - 6/7 days Yoga with Judy Barakat  OB/ gyn History    Gestational History:     Prior Pregnancy: Yes      Number of prior pregnancies: 3    Number of term pregnancies: 3    Delivery Type: vaginal delivery      Delivery Complications:  Ages 22, 25 and 23 - last one was a ceaserean section     Menstrual History:      Menstrual irregularities irregular menses    Painful periods:  No difficulty managing menstrual pain    Tolerates tampons: no    Birth control method: condoms  Bladder Function:     Voiding Difficulties positive for: urgency and frequent urination      Voiding Difficulties comments:     Voiding frequency: every 3-4 hours and every 1-2 hours    Urinary leakage: urine leakage    Urinary leakage aggravated by: coughing, sneezing (sometimes), walking to the bathroom, hearing running water, key-in-the-door syndrome and seeing a toilet    Urinary leakage not aggravated by: exercise and anxiety    Nocturia (episodes per night): 0    Painful urination: No      Intake (ounces): Water intake (oz): 80  Coffee intake (oz): 36  Alcohol intake (oz): wine or beer - varies  Intake (ounces) comment: Public toilet - "hovers" over toilet seat  Bowel Function:     Bowel frequency: daily    [place] Stool Scale: type 4 and type 3  Sexual Function:     Sexually Active:  Sexually active    Pain during intercourse: No    Pain:     No pain reported by patient  Diagnostic Tests:     None    Treatments:     None    Patient Goals:     Patient goals for therapy:  Improved quality of life, return to sport/leisure activities, improved comfort, improved bladder or bowel function and fully empty bladder or bowels      Objective   Pelvic Floor Exam   Position: supine exam    General Perineum Exam:   perineum intact  General perineum exam comments: No discharge, irritation, organ prolapse or skin breakdown evident      Visual Inspection of Perineum:   Excursion of perineal body in cephalad direction with contraction of pelvic floor muscles (PFM): weak  Excursion of perineal body in caudal direction with relaxation of pelvic floor muscles (PFM): weak  Involuntary contraction with coughing: yes  Involuntary relaxation with bearing down: yes  Cotton swab test: non-tender  Cough reflex: cough reflex  Sphincter Tone Resting: normal  Sphincter Tone Squeeze: normal  Sensation: intact    Pelvic Organ Prolapse   no pelvic organ prolapse    Pelvic Floor Muscle Exam     Breathing pattern with contraction: holding breath  Pelvic floor muscle relaxation is complete  PERFECT Score   Power right: 3/5  Power left: 3/5  Endurance (seconds to max): 3  Repetitions (before fatigue): 4    SMEG Biofeedback   to be assessed next treatment               Precautions: standard      Manuals 6/28                                                                Neuro Re-Ed             NMR via BF                                                                                           Ther Ex                                                                                                                     Ther Activity                                       Gait Training                                       Modalities

## 2021-07-02 ENCOUNTER — TELEPHONE (OUTPATIENT)
Dept: PSYCHIATRY | Facility: CLINIC | Age: 51
End: 2021-07-02

## 2021-07-02 NOTE — TELEPHONE ENCOUNTER
Patient called to apologize regarding today's missed appointment  She states it completely slipped her mind and was very apologetic   R/s'd to 7/6/21 at 2pm

## 2021-07-06 ENCOUNTER — TELEPHONE (OUTPATIENT)
Dept: PSYCHIATRY | Facility: CLINIC | Age: 51
End: 2021-07-06

## 2021-07-06 ENCOUNTER — SOCIAL WORK (OUTPATIENT)
Dept: BEHAVIORAL/MENTAL HEALTH CLINIC | Facility: CLINIC | Age: 51
End: 2021-07-06
Payer: COMMERCIAL

## 2021-07-06 DIAGNOSIS — F43.23 ADJUSTMENT DISORDER WITH MIXED ANXIETY AND DEPRESSED MOOD: ICD-10-CM

## 2021-07-06 PROCEDURE — 90834 PSYTX W PT 45 MINUTES: CPT | Performed by: SOCIAL WORKER

## 2021-07-06 NOTE — BH TREATMENT PLAN
Francisca Yanelis  1970       Date of Initial Treatment Plan: 05/21/2020                         Due to IT signature pad issues and due to covid restrictions the undersigned and the client verbally signed off on the plan  Date of Current Treatment Plan: 07/06/21    Treatment Plan Number update     Strengths/Personal Resources for Self Care: strong values,caring,supportive,encouraging and positive    Diagnosis:   Adjustment disorder with depression and anxiety, social anxiety    Area of Needs: Please see below      Long Term Goal 1: I need to develop more self confidence    Target Date: 01/06/2022  Completion Date: TBD         Short Term Objectives for Goal 1: I will take social risks which will show me that no one is above me or better than me  Long Term Goal 2: I need to work on mu social anxiety    Target Date: 01/06/2022  Completion Date: TBD    Short Term Objectives for Goal 2: I will not decline invitations  Long Term Goal # 3: I need to stop worrying over what I cant control     Target Date: 01/06/2022  Completion Date: TBD    Short Term Objectives for Goal 3: Mindfulness, CBT and SFT    GOAL 1: Modality: Individual 2x per month   Completion Date tbd    GOAL 2: Modality: Individual 2x per month   Completion Date tbd     GOAL 3: Modality: Individual 2x per month   Completion Date tbd      Behavioral Health Treatment Plan [facility]: Diagnosis and Treatment Plan explained to Heber So relates understanding diagnosis and is agreeable to Treatment Plan  Client Comments : Please share your thoughts, feelings, need and/or experiences regarding your treatment plan: aSturnino Christianson and the undersigned will work as a team to help her progress on her goals

## 2021-07-06 NOTE — PSYCH
Psychotherapy Provided: Individual Psychotherapy 45 minutes   This note was not shared with the patient due to this is a psychotherapy note  Length of time in session: 45 minutes, follow up in 2 week    Adjustment with depression and anxiety, social anxiety    Goals addressed in session: Goal 1, Goal 2 and Goal 3      Pain:      moderate to severe    0    Current suicide risk : Low     Data:I am trying to be more self aware and focus more on me  I am trying to be a better person  She apologized for the no show  She is wanting to work on her self confidence and self esteem  I have lots of anxiety  She discussed how her 's OCD affects her  She discussed how her  hounds their 22year old son about something he does not want to talk about  Assessment: Deon Roberts is not suicidal or homicidal  However she remains highly anxious  We continue to work on mindfulness, CBT and SFT  Plan: Will see her after her vacation  Behavioral Health Treatment Plan [facility]: Diagnosis and Treatment Plan explained to Gustavo Chambers relates understanding diagnosis and is agreeable to Treatment Plan   Yes

## 2021-07-12 ENCOUNTER — APPOINTMENT (OUTPATIENT)
Dept: PHYSICAL THERAPY | Facility: REHABILITATION | Age: 51
End: 2021-07-12
Payer: COMMERCIAL

## 2021-07-19 ENCOUNTER — OFFICE VISIT (OUTPATIENT)
Dept: PHYSICAL THERAPY | Facility: REHABILITATION | Age: 51
End: 2021-07-19
Payer: COMMERCIAL

## 2021-07-19 DIAGNOSIS — N81.89 PELVIC FLOOR WEAKNESS IN FEMALE: ICD-10-CM

## 2021-07-19 DIAGNOSIS — N39.3 STRESS INCONTINENCE: Primary | ICD-10-CM

## 2021-07-19 PROCEDURE — 97112 NEUROMUSCULAR REEDUCATION: CPT | Performed by: PHYSICAL THERAPIST

## 2021-07-19 PROCEDURE — 97530 THERAPEUTIC ACTIVITIES: CPT | Performed by: PHYSICAL THERAPIST

## 2021-07-19 NOTE — PROGRESS NOTES
Daily Note     Today's date: 2021  Patient name: Riya Grullon  : 1970  MRN: [id number]  Referring provider: ANDRE Riley  Dx:   Encounter Diagnosis     ICD-10-CM    1  Stress incontinence  N39 3    2  Pelvic floor weakness in female  N81 89        From evaluation: Patient reports that she has increased urinary urgency over the past few years  Inability to suppress her urine urge is episodic and intermittent without patient seeing a pattern or relation to behavior  Sometimes can suppress urge to empty bladder for long periods and other times she cannot suppress for 10 seconds  Subjective: Patient hasn't had any close calls  She has been paying attention to holding her bladder longer when able  She reports that last week she had an episode of insensate FI which occurred in the morning  She was menstruating and had just returned from vacation  This was quite upsetting to her  Objective: See treatment diary below    Access Code: [email or web address]  URL: [email or web address]  Date: 2021  Prepared by: Eric Dos Santos    Exercises  Seated Pelvic Floor Contraction - 3 x daily        Assessment: Tolerated treatment well  Patient would benefit from continued PT   BF performed with handheld unit which indicates wnl resting tone in supine and sitting  In supine, she is able to engage PFM to >18 uV but in sitting, her average is around 6uV with decreased holding endurance  We discussed the importance of the core canister and added breathwork to her PFM  Issued HEP with breath and PFM in sitting for 3', 3/day  Plan: Continue per plan of care  NV - review breath and pfm  Consider further strengthening - both supine and sitting        Precautions: standard      Manuals                                                                Neuro Re-Ed             NMR via BF                                                                                           Ther Ex Ther Activity             education  Core canister and breath x10'                        Gait Training                                       Modalities             NMR via BF  45'

## 2021-07-25 NOTE — PROGRESS NOTES
Daily Note     Today's date: 2021  Patient name: Jamie Cotton  : 1970  MRN: [id number]  Referring provider: ANDRE Mckinney  Dx:   Encounter Diagnosis     ICD-10-CM    1  Stress incontinence  N39 3    2  Pelvic floor weakness in female  N81 89        From evaluation: Patient reports that she has increased urinary urgency over the past few years  Inability to suppress her urine urge is episodic and intermittent without patient seeing a pattern or relation to behavior  Sometimes can suppress urge to empty bladder for long periods and other times she cannot suppress for 10 seconds  Subjective: Patient reports semi-compliance with HEP and she got her period again after 12 days and is currently menstruating  She notes that this causes her BMs to be more frequent  She has been challenged with HEP  Objective: See treatment diary below    Access Code: [email or web address]  URL: [email or web address]  Date: 2021  Prepared by: Sudheer Seymour    Exercises  Supine Pelvic Floor Contraction - 2 x daily  Supine Hip Adduction Isometric with Ball - 2 x daily  Supine Bridge with Pelvic Floor Contraction - 2 x daily    Assessment: Tolerated treatment well  Patient would benefit from continued PT   BF performed with handheld unit which indicates wnl resting tone in supine and sitting  In supine, she is able to engage PFM to >18 uV but in sitting, her average is around 6uV with decreased holding endurance  We discussed the importance of the core canister and added breathwork to her PFM  Issued HEP with breath and PFM in sitting for 3', 3/day  Plan: Continue per plan of care  NV - review breath and pfm  Consider further strengthening - both supine and sitting        Precautions: standard      Manuals                                                               Neuro Re-Ed             NMR via BF             DB+PFM   10' Ther Ex             DB+PFM+TA   supine and sitting x10          DB+PFM+TA + hip addcution   x15          DB+PFM+TA + bridges   x15                                                                           Ther Activity             education  Core canister and breath x10'                        Gait Training                                       Modalities             NMR via BF  45'

## 2021-07-26 ENCOUNTER — OFFICE VISIT (OUTPATIENT)
Dept: PHYSICAL THERAPY | Facility: REHABILITATION | Age: 51
End: 2021-07-26
Payer: COMMERCIAL

## 2021-07-26 DIAGNOSIS — N81.89 PELVIC FLOOR WEAKNESS IN FEMALE: ICD-10-CM

## 2021-07-26 DIAGNOSIS — N39.3 STRESS INCONTINENCE: Primary | ICD-10-CM

## 2021-07-26 PROCEDURE — 97530 THERAPEUTIC ACTIVITIES: CPT | Performed by: PHYSICAL THERAPIST

## 2021-07-26 PROCEDURE — 97110 THERAPEUTIC EXERCISES: CPT | Performed by: PHYSICAL THERAPIST

## 2021-08-02 ENCOUNTER — OFFICE VISIT (OUTPATIENT)
Dept: PHYSICAL THERAPY | Facility: REHABILITATION | Age: 51
End: 2021-08-02
Payer: COMMERCIAL

## 2021-08-02 ENCOUNTER — SOCIAL WORK (OUTPATIENT)
Dept: BEHAVIORAL/MENTAL HEALTH CLINIC | Facility: CLINIC | Age: 51
End: 2021-08-02
Payer: COMMERCIAL

## 2021-08-02 DIAGNOSIS — N39.3 STRESS INCONTINENCE: Primary | ICD-10-CM

## 2021-08-02 DIAGNOSIS — F43.23 ADJUSTMENT DISORDER WITH MIXED ANXIETY AND DEPRESSED MOOD: Primary | ICD-10-CM

## 2021-08-02 DIAGNOSIS — F40.10 SOCIAL ANXIETY DISORDER: ICD-10-CM

## 2021-08-02 DIAGNOSIS — N81.89 PELVIC FLOOR WEAKNESS IN FEMALE: ICD-10-CM

## 2021-08-02 PROCEDURE — 97530 THERAPEUTIC ACTIVITIES: CPT | Performed by: PHYSICAL THERAPIST

## 2021-08-02 PROCEDURE — 90834 PSYTX W PT 45 MINUTES: CPT | Performed by: SOCIAL WORKER

## 2021-08-02 PROCEDURE — 97110 THERAPEUTIC EXERCISES: CPT | Performed by: PHYSICAL THERAPIST

## 2021-08-02 NOTE — PSYCH
Psychotherapy Provided: Individual Psychotherapy 45 minutes     Length of time in session: 45 minutes, follow up in 2 week    Social anxiety and Adjustment disorder with anxiety and depression  Goals addressed in session: Goal 1, Goal 2 and Goal 3      Pain:      moderate to severe    0    Current suicide risk : Low     Data: Freddie Rivas arrived for her session  She discussed issues she is going thru medically and sometimes her frustration with certain providers  She discussed issues with two of her sons and one situation that also involves her   They were at a shore trip that went well  She then discussed a family reunion trip with her family of origin and how she got to know some relatives on a deeper level   She also learned a lot about family members by observing  Freddie Rivas shared how her 's OCD somewhat clouded the trip but she coped with it  She discussed how her [employer] group is helping her  Assessment: Per her goals Freddie Rivas shared how her self confidence is growing, how she is interacting more with people and thereby having less social anxiety and how she is becoming better about knowing what she can control and what she can't and accepting the difference  She continues to struggle concerning society's issues  A large part of the session was her venting about a sister who has bipolar and can be quite difficult to deal with  The undersigned provided support, encouragement along with mindfulness strategies  She denies suicidal/homicidal ideation, plan or intent  Plan: Continue to help her skill build in distress tolerance and to continue to help her progress on her goals  Behavioral Health Treatment Plan [facility]: Diagnosis and Treatment Plan explained to Dee Dee Gonzalez relates understanding diagnosis and is agreeable to Treatment Plan   Yes

## 2021-08-02 NOTE — PROGRESS NOTES
Daily Note     Today's date: 2021  Patient name: Mau Finch  : 1970  MRN: [id number]  Referring provider: ANDRE Santiago  Dx:   Encounter Diagnosis     ICD-10-CM    1  Stress incontinence  N39 3    2  Pelvic floor weakness in female  N81 89        From evaluation: Patient reports that she has increased urinary urgency over the past few years  Inability to suppress her urine urge is episodic and intermittent without patient seeing a pattern or relation to behavior  Sometimes can suppress urge to empty bladder for long periods and other times she cannot suppress for 10 seconds  Subjective: Patient reports that her period has ceased  At least once daily with home execises  She is feeling less urinary urgency - she is able to pause and wait if she feels an urge without feeling as much panic  Reports that she feels it is easier to do her PFM exercises in sitting and sanding vs  Supine  Objective: See treatment diary below    Access Code: [email or web address]  URL: [email or web address]  Date: 2021  Prepared by: Anai Culver    Exercises  Seated Pelvic Floor Contraction with Isometric Hip Adduction - 2 x daily - 7 x weekly - 10 reps  Seated Shoulder Horizontal Abduction with Resistance - 2 x daily - 7 x weekly - 10 reps  Sit to Stand with Pelvic Floor Contraction - 2 x daily - 7 x weekly - 10 reps      Assessment: Tolerated treatment well  Patient would benefit from continued PT   Did well with exercises today  Pilates reformer was introduced with focus on TA+PFM and slow movements in supine  We also rogressed to more upright positioning with good subjective input from patient  She is very focused with exercises and doing well  Plan: Continue per plan of care           Precautions: standard      Manuals                                                              Neuro Re-Ed             NMR via BF             DB+PFM   10' Ther Ex             Nustep    L6 x8', seat #9         DB+PFM+TA   supine and sitting x10 reviewed         DB+PFM+TA + hip addcution   x15 seated x10         DB+PFM+TA + bridges   x15          Seated PFM + horiz abd    GTB x10         Sit to stand w/ PFM    x10         reformer    Slides, 1 Red x10              Bridges x10, 1 red                      Ther Activity             education  Core canister and breath x10'  Urge suppression x10'                      Gait Training                                       Modalities             NMR via BF  45'

## 2021-08-09 ENCOUNTER — TELEPHONE (OUTPATIENT)
Dept: PSYCHIATRY | Facility: CLINIC | Age: 51
End: 2021-08-09

## 2021-08-09 ENCOUNTER — OFFICE VISIT (OUTPATIENT)
Dept: PHYSICAL THERAPY | Facility: REHABILITATION | Age: 51
End: 2021-08-09
Payer: COMMERCIAL

## 2021-08-09 DIAGNOSIS — N81.89 PELVIC FLOOR WEAKNESS IN FEMALE: ICD-10-CM

## 2021-08-09 DIAGNOSIS — N39.3 STRESS INCONTINENCE: Primary | ICD-10-CM

## 2021-08-09 PROCEDURE — 97110 THERAPEUTIC EXERCISES: CPT

## 2021-08-09 PROCEDURE — 97112 NEUROMUSCULAR REEDUCATION: CPT

## 2021-08-09 NOTE — PROGRESS NOTES
Daily Note     Today's date: 2021  Patient name: Luis Lobo  : 1970  MRN: [id number]  Referring provider: ANDRE Don  Dx:   Encounter Diagnosis     ICD-10-CM    1  Stress incontinence  N39 3    2  Pelvic floor weakness in female  N81 89        From evaluation: Patient reports that she has increased urinary urgency over the past few years  Inability to suppress her urine urge is episodic and intermittent without patient seeing a pattern or relation to behavior  Sometimes can suppress urge to empty bladder for long periods and other times she cannot suppress for 10 seconds  Start Time: 1330  Stop Time: 1425  Total time in clinic (min): 55 minutes  Subjective: Pt reports she's noticing improvement with urgency control  Continues to be compliant with her exercises once to twice a day  Objective: See treatment diary below    Access Code: [email or web address]  URL: [email or web address]  Date: 2021  Prepared by: Marlon Vo    Exercises  Seated Pelvic Floor Contraction with Isometric Hip Adduction - 2 x daily - 7 x weekly - 10 reps  Seated Shoulder Horizontal Abduction with Resistance - 2 x daily - 7 x weekly - 10 reps  Sit to Stand with Pelvic Floor Contraction - 2 x daily - 7 x weekly - 10 reps      Assessment: Tolerated treatment well  Patient would benefit from continued PT   Pt appears to be making progress with coordination of breath and activation of muscles  Needs to be mindful when performing her exercises  With standing series, more challenged when wbing on R LE and benefits from tactile and verbal cueing to improve form  Plan: Continue per plan of care           Precautions: standard      Manuals                                                             Neuro Re-Ed             NMR via BF             DB+PFM   10'                       reformer     10' cueing                                               Ther Ex             Walker Jerica L6 x8', seat #9 L6 x 10'        DB+PFM+TA   supine and sitting x10 reviewed         DB+PFM+TA + hip addcution   x15 seated x10 1'        DB+PFM+TA + bridges   x15  1'        Seated PFM + horiz abd    GTB x10         Sit to stand w/ PFM    x10 With toe touch x10        reformer    Slides, 1 Red x10  slides 1 red x 1' bridges x 1' hip abd/add x 1'  wheelbarrow x 1' standing 1 white froward and reverse lunges (15' + 10' neuro)            Bridges x10, 1 red                      Ther Activity             education  Core canister and breath x10'  Urge suppression x10'                      Gait Training                                       Modalities             NMR via BF  45'

## 2021-08-09 NOTE — TELEPHONE ENCOUNTER
Pt called back and left message to confirm the cancellation of the 8/20 therapy appt, but requested a call back to adjust some other appointments and schedule  She can be reached at [phone]

## 2021-08-16 ENCOUNTER — APPOINTMENT (OUTPATIENT)
Dept: PHYSICAL THERAPY | Facility: REHABILITATION | Age: 51
End: 2021-08-16
Payer: COMMERCIAL

## 2021-08-19 NOTE — PROGRESS NOTES
Daily Note     Today's date: 2021  Patient name: Joshua Gomez  : 1970  MRN: [id number]  Referring provider: ANDRE Oden  Dx:   Encounter Diagnosis     ICD-10-CM    1  Stress incontinence  N39 3    2  Pelvic floor weakness in female  N81 89        From evaluation: Patient reports that she has increased urinary urgency over the past few years  Inability to suppress her urine urge is episodic and intermittent without patient seeing a pattern or relation to behavior  Sometimes can suppress urge to empty bladder for long periods and other times she cannot suppress for 10 seconds  Start Time: 0950  Stop Time: 1040  Total time in clinic (min): 50 minutes     Subjective: Pt continues to have some urgency symptoms but overall is noticing more PFM awareness  Pt is incorporating her exercises t/o the day with more functional activities  Objective: See treatment diary below    Access Code: [email or web address]  URL: [email or web address]  Date: 2021  Prepared by: Emely Mccord    Exercises  Mini Squat - 1 x daily - 7 x weekly - 3 sets - 10 reps  Forward Lunge with Rotation - 1 x daily - 7 x weekly - 3 sets - 10 reps  Lunge Matrix on Slider - 1 x daily - 7 x weekly - 3 sets - 10 reps  3-Way Lunge on Slider - 1 x daily - 7 x weekly - 3 sets - 10 reps    Bladder Matters PDF emailed to her today  Assessment: Tolerated treatment well  Patient would benefit from continued PT   Shared with pt urgency PDF handout and issued a new HEP to improve core and hip stability  Continues to be more challenged when wbing through R LE  Pt encouraged to be compliant with her HEP and to share with Simona Heimlich next week if they are beneficial or if she is unable to reproduce those exercises at home  Plan: Continue per plan of care           Precautions: standard      Manuals                                                            Neuro Re-Ed             NMR via BF             DB+PFM   10'                       reformer     10' cueing                                               Ther Ex             Nustep    L6 x8', seat #9 L6 x 10' L6 x10'       DB+PFM+TA   supine and sitting x10 reviewed         DB+PFM+TA + hip addcution   x15 seated x10 1' On reformer       DB+PFM+TA + bridges   x15  1' On reformer       Seated PFM + horiz abd    GTB x10         Sit to stand w/ PFM    x10 With toe touch x10 With toe touch x10       reformer    Slides, 1 Red x10  slides 1 red x 1' bridges x 1' hip abd/add x 1'  wheelbarrow x 1' standing 1 white froward and reverse lunges (15' + 10' neuro) supine slides 1 red/ standing hip series 1 blue  + wheelbarrel x 2  30'           Bridges x10, 1 red                      Ther Activity             education  Core canister and breath x10'  Urge suppression x10'  Bladder Matters PDF 8'                    Gait Training                                       Modalities             NMR via BF  45'

## 2021-08-23 ENCOUNTER — OFFICE VISIT (OUTPATIENT)
Dept: PHYSICAL THERAPY | Facility: REHABILITATION | Age: 51
End: 2021-08-23
Payer: COMMERCIAL

## 2021-08-23 DIAGNOSIS — N39.3 STRESS INCONTINENCE: Primary | ICD-10-CM

## 2021-08-23 DIAGNOSIS — N81.89 PELVIC FLOOR WEAKNESS IN FEMALE: ICD-10-CM

## 2021-08-23 PROCEDURE — 97110 THERAPEUTIC EXERCISES: CPT

## 2021-08-23 PROCEDURE — 97530 THERAPEUTIC ACTIVITIES: CPT

## 2021-08-29 NOTE — PROGRESS NOTES
Daily Note / Discharge    Today's date: 2021  Patient name: Lottie Grullon  : 1970  MRN: [id number]  Referring provider: ANDRE Gomez  Dx:   Encounter Diagnosis     ICD-10-CM    1  Stress incontinence  N39 3    2  Pelvic floor weakness in female  N81 89        From evaluation: Patient reports that she has increased urinary urgency over the past few years  Inability to suppress her urine urge is episodic and intermittent without patient seeing a pattern or relation to behavior  Sometimes can suppress urge to empty bladder for long periods and other times she cannot suppress for 10 seconds  Subjective: Pt is overall pleased with progress  She still has some episodes of urinary urgency but is pleased with her ability to suppress that this morning  She reports 70% improvement since [facility]  Objective: See treatment diary below      Assessment: Tolerated treatment well  Patient is appropriate for discharge to self management  She has all exercise handouts  We discussed avoiding emptying "just in case" at length and she expressed understanding  She was provided discharge instructions and recommended to reach out with any questions or concerns in the future  Goals  STGs to be met in 4 weeks:  * Patient will be compliant with introductory HEP as prescribed  MET  * Presents with improved understanding of bladder irritants in diet and makes concerted effort to reduce them by at least 50-75%  MET    LTGs to be met by discharge:  * Patient will present with a 60 % improvement on FOTO score by discharge to indicate improved symptom management  * Patient reports that she is able to successfully suppress an urge to empty her bladder for 20' without leakage  * Normalize sEMG findings to indicate strength average > 12uV and resting average < 2 5uV  NA UPON DISCHARGE   * Presents with improved fluid intake to avoid concentrated and irritating urine by discharge   MET  * Patient implements urge suppression strategies throughout the day and reports that her average voiding interval is 2+ hours upon discharge  IMPROVED BUT NOT %  * Patient will be compliant with comprehensive home exercise program for self management of condition  MET    Plan: Discharge PT           Precautions: standard      Manuals 6/28 7/19 7/26 8/2 8/9 8/23 8/30                                           Neuro Re-Ed          NMR via BF          DB+PFM   10'    10' seated/ standing             reformer     10' cueing                                   Ther Ex          Nustep    L6 x8', seat #9 L6 x 10' L6 x10' L5x8'   Lunges on sliders + PFM+breath       3 way x10' each direction   DB+PFM+TA   supine and sitting x10 reviewed      DB+PFM+TA + hip addcution   x15 seated x10 1' On reformer    DB+PFM+TA + bridges   x15  1' On reformer    Seated PFM + horiz abd    GTB x10      Sit to stand w/ PFM    x10 With toe touch x10 With toe touch x10 With TT x10   reformer    Slides, 1 Red x10  slides 1 red x 1' bridges x 1' hip abd/add x 1'  wheelbarrow x 1' standing 1 white froward and reverse lunges (15' + 10' neuro) supine slides 1 red/ standing hip series 1 blue  + wheelbarrel x 2  30'        Bridges x10, 1 red   10             Ther Activity          education  Core canister and breath x10'  Urge suppression x10'  Bladder Matters PDF 8' dischargeinstructions x10' (urge suppresion, dydration, avoiding JICs)             Gait Training                              Modalities          NMR via BF  45'

## 2021-08-30 ENCOUNTER — OFFICE VISIT (OUTPATIENT)
Dept: PHYSICAL THERAPY | Facility: REHABILITATION | Age: 51
End: 2021-08-30
Payer: COMMERCIAL

## 2021-08-30 DIAGNOSIS — N81.89 PELVIC FLOOR WEAKNESS IN FEMALE: ICD-10-CM

## 2021-08-30 DIAGNOSIS — N39.3 STRESS INCONTINENCE: Primary | ICD-10-CM

## 2021-08-30 PROCEDURE — 97110 THERAPEUTIC EXERCISES: CPT | Performed by: PHYSICAL THERAPIST

## 2021-08-30 PROCEDURE — 97112 NEUROMUSCULAR REEDUCATION: CPT | Performed by: PHYSICAL THERAPIST

## 2021-08-30 PROCEDURE — 97530 THERAPEUTIC ACTIVITIES: CPT | Performed by: PHYSICAL THERAPIST

## 2021-09-03 ENCOUNTER — SOCIAL WORK (OUTPATIENT)
Dept: BEHAVIORAL/MENTAL HEALTH CLINIC | Facility: CLINIC | Age: 51
End: 2021-09-03
Payer: COMMERCIAL

## 2021-09-03 DIAGNOSIS — F40.10 SOCIAL ANXIETY DISORDER: ICD-10-CM

## 2021-09-03 DIAGNOSIS — F43.23 ADJUSTMENT DISORDER WITH MIXED ANXIETY AND DEPRESSED MOOD: Primary | ICD-10-CM

## 2021-09-03 PROCEDURE — 90834 PSYTX W PT 45 MINUTES: CPT | Performed by: SOCIAL WORKER

## 2021-09-03 NOTE — PSYCH
Psychotherapy Provided: Individual Psychotherapy 45 minutes     Length of time in session: 45 minutes, follow up in 2 week    Anxiety, social anxiety, marital issues    Goals addressed in session: Goal 1, Goal 2 and Goal 3      Pain:      moderate to severe    0    Current suicide risk : Low     Data: Pranav Mazariegos discussed what are healthy alternatives to coping versus eating, drinking etc  We discussed healthy ways to deal with stress  She discussed how she is improving her self confidence, dealing with her anxiety and social anxiety but also how she continues to struggle with situations or people she cant control but have to deal with  She discussed some communication issues and interaction issues with a  with severe OCD  She may end up going to a relative wedding without him due to his issues  Assessment: Pranav Mazariegos is not suicidal or homicidal  However she remains stressed and overwhelmed by the world and the current world situation  We continue to work on coping strategies and we work on mindfulness, distress tolerance and solution focused strategies  Plan: Continue to skill build in distress tolerance  Behavioral Health Treatment Plan [facility]: Diagnosis and Treatment Plan explained to Emma Perez relates understanding diagnosis and is agreeable to Treatment Plan   Yes

## 2021-09-16 PROBLEM — N32.81 OVERACTIVE BLADDER: Status: ACTIVE | Noted: 2021-09-16

## 2021-09-16 PROBLEM — Z98.891 STATUS POST C-SECTION: Status: ACTIVE | Noted: 2021-09-16

## 2021-09-16 PROBLEM — Z91.09 MULTIPLE ENVIRONMENTAL ALLERGIES: Status: ACTIVE | Noted: 2021-09-16

## 2021-10-01 ENCOUNTER — SOCIAL WORK (OUTPATIENT)
Dept: BEHAVIORAL/MENTAL HEALTH CLINIC | Facility: CLINIC | Age: 51
End: 2021-10-01
Payer: COMMERCIAL

## 2021-10-01 DIAGNOSIS — F40.10 SOCIAL ANXIETY DISORDER: ICD-10-CM

## 2021-10-01 DIAGNOSIS — F43.23 ADJUSTMENT DISORDER WITH MIXED ANXIETY AND DEPRESSED MOOD: Primary | ICD-10-CM

## 2021-10-01 PROCEDURE — 90834 PSYTX W PT 45 MINUTES: CPT | Performed by: SOCIAL WORKER

## 2021-10-21 ENCOUNTER — SOCIAL WORK (OUTPATIENT)
Dept: BEHAVIORAL/MENTAL HEALTH CLINIC | Facility: CLINIC | Age: 51
End: 2021-10-21
Payer: COMMERCIAL

## 2021-10-21 DIAGNOSIS — F40.10 SOCIAL ANXIETY DISORDER: ICD-10-CM

## 2021-10-21 DIAGNOSIS — F43.23 ADJUSTMENT DISORDER WITH MIXED ANXIETY AND DEPRESSED MOOD: Primary | ICD-10-CM

## 2021-10-21 PROCEDURE — 90834 PSYTX W PT 45 MINUTES: CPT | Performed by: SOCIAL WORKER

## 2021-11-11 ENCOUNTER — SOCIAL WORK (OUTPATIENT)
Dept: BEHAVIORAL/MENTAL HEALTH CLINIC | Facility: CLINIC | Age: 51
End: 2021-11-11
Payer: COMMERCIAL

## 2021-11-11 DIAGNOSIS — F40.10 SOCIAL ANXIETY DISORDER: ICD-10-CM

## 2021-11-11 DIAGNOSIS — F43.23 ADJUSTMENT DISORDER WITH MIXED ANXIETY AND DEPRESSED MOOD: Primary | ICD-10-CM

## 2021-11-11 PROCEDURE — 90834 PSYTX W PT 45 MINUTES: CPT | Performed by: SOCIAL WORKER

## 2021-11-30 ENCOUNTER — ANNUAL EXAM (OUTPATIENT)
Dept: OBGYN CLINIC | Facility: CLINIC | Age: 51
End: 2021-11-30
Payer: COMMERCIAL

## 2021-11-30 VITALS
HEIGHT: 66 IN | WEIGHT: 144.2 LBS | SYSTOLIC BLOOD PRESSURE: 120 MMHG | BODY MASS INDEX: 23.18 KG/M2 | DIASTOLIC BLOOD PRESSURE: 78 MMHG

## 2021-11-30 DIAGNOSIS — Z12.31 ENCOUNTER FOR SCREENING MAMMOGRAM FOR BREAST CANCER: ICD-10-CM

## 2021-11-30 DIAGNOSIS — Z01.419 ENCOUNTER FOR GYNECOLOGICAL EXAMINATION WITHOUT ABNORMAL FINDING: Primary | ICD-10-CM

## 2021-11-30 PROCEDURE — S0612 ANNUAL GYNECOLOGICAL EXAMINA: HCPCS | Performed by: PHYSICIAN ASSISTANT

## 2021-11-30 PROCEDURE — G0145 SCR C/V CYTO,THINLAYER,RESCR: HCPCS | Performed by: PHYSICIAN ASSISTANT

## 2021-11-30 PROCEDURE — G0476 HPV COMBO ASSAY CA SCREEN: HCPCS | Performed by: PHYSICIAN ASSISTANT

## 2021-11-30 RX ORDER — RIBOFLAVIN (VITAMIN B2) 100 MG
100 TABLET ORAL DAILY
COMMUNITY

## 2021-12-02 LAB
HPV HR 12 DNA CVX QL NAA+PROBE: NEGATIVE
HPV16 DNA CVX QL NAA+PROBE: NEGATIVE
HPV18 DNA CVX QL NAA+PROBE: NEGATIVE

## 2021-12-06 ENCOUNTER — SOCIAL WORK (OUTPATIENT)
Dept: BEHAVIORAL/MENTAL HEALTH CLINIC | Facility: CLINIC | Age: 51
End: 2021-12-06
Payer: COMMERCIAL

## 2021-12-06 DIAGNOSIS — F43.23 ADJUSTMENT DISORDER WITH MIXED ANXIETY AND DEPRESSED MOOD: Primary | ICD-10-CM

## 2021-12-06 DIAGNOSIS — F40.10 SOCIAL ANXIETY DISORDER: ICD-10-CM

## 2021-12-06 LAB
LAB AP GYN PRIMARY INTERPRETATION: NORMAL
Lab: NORMAL

## 2021-12-06 PROCEDURE — 90834 PSYTX W PT 45 MINUTES: CPT | Performed by: SOCIAL WORKER

## 2021-12-22 ENCOUNTER — SOCIAL WORK (OUTPATIENT)
Dept: BEHAVIORAL/MENTAL HEALTH CLINIC | Facility: CLINIC | Age: 51
End: 2021-12-22
Payer: COMMERCIAL

## 2021-12-22 DIAGNOSIS — F43.23 ADJUSTMENT DISORDER WITH MIXED ANXIETY AND DEPRESSED MOOD: Primary | ICD-10-CM

## 2021-12-22 PROCEDURE — 90834 PSYTX W PT 45 MINUTES: CPT | Performed by: SOCIAL WORKER

## 2022-01-10 ENCOUNTER — SOCIAL WORK (OUTPATIENT)
Dept: BEHAVIORAL/MENTAL HEALTH CLINIC | Facility: CLINIC | Age: 52
End: 2022-01-10
Payer: COMMERCIAL

## 2022-01-10 DIAGNOSIS — F43.23 ADJUSTMENT DISORDER WITH MIXED ANXIETY AND DEPRESSED MOOD: Primary | ICD-10-CM

## 2022-01-10 PROCEDURE — 90834 PSYTX W PT 45 MINUTES: CPT | Performed by: SOCIAL WORKER

## 2022-01-10 NOTE — PSYCH
Psychotherapy Provided: Individual Psychotherapy 45 minutes     Length of time in session: 45 minutes, follow up in 2 week    Depression and anxiety    Goals addressed in session: goal 1 and goal 3     Pain:      moderate to severe    0    Current suicide risk : Low     Data: Tim Funes arrived for her session  She discussed how covid is affecting her, how family and work relationships are going and what she is doing for herself physically, emotionally and spiritually  Her biggest issue is still dealing with her addicted son [de-identified]  Per her goals she is becoming more self confident in drawing and sticking to boundaries and she is learning to accept what she can't control  Assessment: Tim Funes denies suicidal, homicidal ideation, plan or intent  However she is still quite anxious about dealing with an addicted son  We continue to work on mindfulness and CBT  Plan: Continue to help her skill build in distress tolerance  Behavioral Health Treatment Plan [facility]: Diagnosis and Treatment Plan explained to Gina Flor relates understanding diagnosis and is agreeable to Treatment Plan   Yes

## 2022-01-17 ENCOUNTER — TELEPHONE (OUTPATIENT)
Dept: PHYSICAL THERAPY | Facility: REHABILITATION | Age: 52
End: 2022-01-17

## 2022-01-17 NOTE — TELEPHONE ENCOUNTER
Katelyn called this morning due to having more urgency and leakage  Was discharged last August and had done well with PT round  Reviewed a few strategies and reminded the importance of being compliant with HEP and proper hydration  Pt encouraged to reach back out to us if she feels she could use a new eval and a few PT sessions

## 2022-02-28 ENCOUNTER — SOCIAL WORK (OUTPATIENT)
Dept: BEHAVIORAL/MENTAL HEALTH CLINIC | Facility: CLINIC | Age: 52
End: 2022-02-28
Payer: COMMERCIAL

## 2022-02-28 DIAGNOSIS — F43.23 ADJUSTMENT DISORDER WITH MIXED ANXIETY AND DEPRESSED MOOD: Primary | ICD-10-CM

## 2022-02-28 DIAGNOSIS — F40.10 SOCIAL ANXIETY DISORDER: ICD-10-CM

## 2022-02-28 PROCEDURE — 90834 PSYTX W PT 45 MINUTES: CPT | Performed by: SOCIAL WORKER

## 2022-02-28 NOTE — PSYCH
Psychotherapy Provided: Individual Psychotherapy 45 minutes     Length of time in session: 45 minutes, follow up in 2 week    Adjustment disorder with anxiety and depression, social anxiety  Goals addressed in session: Goal 1 and Goal 3      Pain:      moderate to severe    0    Current suicide risk : Low     Data: Deandre Kelly discussed how her youngest son who was sleeping on a friends couch relapsed and the couple kicked him out  Due to threatening suicide the female of the couple had him 302ed  Deandre Kelly discussed how everyone in the family is handling things differently than her and how it is affecting her self confidence  She is also practicing the reality of learning what she can control and what she cant  Assessment: Deandre Kelly denies suicidal/homicidal ideation, plan or intent  However she is depressed and anxious about her son and how her family is affected by this  I provided support, empathy and encouragement  Plan: Continue to help her maintain her boundaries  Behavioral Health Treatment Plan [facility]: Diagnosis and Treatment Plan explained to Sergo Collier relates understanding diagnosis and is agreeable to Treatment Plan   Yes

## 2022-03-14 ENCOUNTER — SOCIAL WORK (OUTPATIENT)
Dept: BEHAVIORAL/MENTAL HEALTH CLINIC | Facility: CLINIC | Age: 52
End: 2022-03-14
Payer: COMMERCIAL

## 2022-03-14 DIAGNOSIS — F40.10 SOCIAL ANXIETY DISORDER: ICD-10-CM

## 2022-03-14 DIAGNOSIS — F43.23 ADJUSTMENT DISORDER WITH MIXED ANXIETY AND DEPRESSED MOOD: Primary | ICD-10-CM

## 2022-03-14 PROCEDURE — 90834 PSYTX W PT 45 MINUTES: CPT | Performed by: SOCIAL WORKER

## 2022-03-14 NOTE — PSYCH
Psychotherapy Provided: Individual Psychotherapy 45 minutes     Length of time in session: 45 minutes, follow up in 2 week    Social anxiety, adjustment disorder with depression and anxiety  Goals addressed in session: Goal 1, Goal 2 and Goal 3      Pain:      moderate to severe    0    Current suicide risk : Low     Data:Lorrie spent the entire session discussing her addicted son  Assessment: This issue is the biggest concern on her mind currently  We discussed coping strategies  Plan: Continue to help her skill build in distress tolerance  Behavioral Health Treatment Plan [facility]: Diagnosis and Treatment Plan explained to Celsa Quinn relates understanding diagnosis and is agreeable to Treatment Plan   Yes

## 2022-03-17 ENCOUNTER — TELEPHONE (OUTPATIENT)
Dept: FAMILY MEDICINE CLINIC | Facility: CLINIC | Age: 52
End: 2022-03-17

## 2022-03-17 PROBLEM — E55.9 VITAMIN D DEFICIENCY: Status: ACTIVE | Noted: 2022-03-17

## 2022-03-17 NOTE — TELEPHONE ENCOUNTER
Patient has an yearly visit in May and she is asking for lab orders  She would also like a Sophia LACY added in to that    She uses [facility]    Orders to be mailed to patient

## 2022-04-21 ENCOUNTER — SOCIAL WORK (OUTPATIENT)
Dept: BEHAVIORAL/MENTAL HEALTH CLINIC | Facility: CLINIC | Age: 52
End: 2022-04-21
Payer: COMMERCIAL

## 2022-04-21 DIAGNOSIS — F43.23 ADJUSTMENT DISORDER WITH MIXED ANXIETY AND DEPRESSED MOOD: Primary | ICD-10-CM

## 2022-04-21 DIAGNOSIS — F40.10 SOCIAL ANXIETY DISORDER: ICD-10-CM

## 2022-04-21 PROCEDURE — 90834 PSYTX W PT 45 MINUTES: CPT | Performed by: SOCIAL WORKER

## 2022-04-21 NOTE — PSYCH
Psychotherapy Provided: Individual Psychotherapy 45 minutes     Length of time in session: 45 minutes, follow up in 2 week    Adjustment disorder with depression and anxiety  Goals addressed in session: Goal 1 and Goal 2     Pain:      moderate to severe    0    Current suicide risk : Low     Data:Lorrie continues to discuss the many issues with her youngest son  He is drug addicted and has severe behavioral health issues and has recently been hospitalized and she thinks he is in a rehab as we speak  She spoke most of the session concerning her son  She is attending a family to family program thru [facility]  We discussed her self confidence and her social anxiety  She has greatly improved with accepting what she can control and what she can't  Assessment: Hazel Butler denies suicidal/homicidal ideation, plan or intent  She is anxious about her son's situation but is more accepting that it is out of her control  Plan: Continue to help her via distress tolerance skills  Behavioral Health Treatment Plan [facility]: Diagnosis and Treatment Plan explained to Johnnie Zaragoza relates understanding diagnosis and is agreeable to Treatment Plan   Yes

## 2022-05-10 ENCOUNTER — OFFICE VISIT (OUTPATIENT)
Dept: FAMILY MEDICINE CLINIC | Facility: CLINIC | Age: 52
End: 2022-05-10
Payer: COMMERCIAL

## 2022-05-10 VITALS
HEART RATE: 70 BPM | RESPIRATION RATE: 15 BRPM | WEIGHT: 140 LBS | BODY MASS INDEX: 22.5 KG/M2 | HEIGHT: 66 IN | OXYGEN SATURATION: 98 % | SYSTOLIC BLOOD PRESSURE: 122 MMHG | DIASTOLIC BLOOD PRESSURE: 78 MMHG

## 2022-05-10 DIAGNOSIS — F43.23 ADJUSTMENT DISORDER WITH MIXED ANXIETY AND DEPRESSED MOOD: Primary | ICD-10-CM

## 2022-05-10 PROCEDURE — 99396 PREV VISIT EST AGE 40-64: CPT | Performed by: FAMILY MEDICINE

## 2022-05-10 PROCEDURE — 3008F BODY MASS INDEX DOCD: CPT | Performed by: FAMILY MEDICINE

## 2022-05-10 PROCEDURE — 1036F TOBACCO NON-USER: CPT | Performed by: FAMILY MEDICINE

## 2022-05-10 NOTE — PROGRESS NOTES
SUBJECTIVE:-------------------------------------------------------------------------------------------    Tori Sadler is a 46 y o   female and is here for routine health maintenance  Health Maintenance   Topic Date Due    COVID-19 Vaccine (1) Never done    Breast Cancer Screening: Mammogram  11/25/2021    Annual Physical  04/08/2022    HIV Screening  05/10/2024 (Originally 7/23/1985)    Hepatitis C Screening  06/10/2024 (Originally 1970)    Influenza Vaccine (Season Ended) 09/01/2022    BMI: Adult  11/30/2022    Cervical Cancer Screening  11/30/2023    DTaP,Tdap,and Td Vaccines (2 - Td or Tdap) 04/16/2028    Colorectal Cancer Screening  10/04/2028    Pneumococcal Vaccine: Pediatrics (0 to 5 Years) and At-Risk Patients (6 to 59 Years)  Aged Out    HIB Vaccine  Aged Out    Hepatitis B Vaccine  Aged Out    IPV Vaccine  Aged Out    Hepatitis A Vaccine  Aged Out    Meningococcal ACWY Vaccine  Aged Out    HPV Vaccine  Aged [employer] History   Administered Date(s) Administered    Influenza, seasonal, injectable 1970    Tdap 04/16/2018    Tuberculin Skin Test-PPD Intradermal 05/01/2015         Diet and Physical Activity  Diet: well balanced diet  Body mass index is 22 6 kg/m²  Exercise: frequently      General Health  Hearing:  Is normal  Vision: sees ophthalmologist/optometrist yearly  Dental:  Sees dentist every 6 months      Smoker no        ASSESSMENT/PLAN:-------------------------------------------------------------------------------------------    Patient's physical is up-to-date   Immunizations; patient does not want COVID and will consider Shingrix  Cancer screenings are up-to-date    Possible Raynaud's phenomena  When her hands on the steering wheel or position are fingers tend to turn white  Recently the tips seem to turn red and bulbous  Just something that she has noticed        Patient instructed on exercise  Patient instructed on healthy choices for diet NEXT PHYSICAL 1 YEAR          The following portions of the patient's history were reviewed and updated as appropriate: allergies, current medications, past family history, past medical history, past social history, past surgical history and problem list       OBJECTIVE:---------------------------------------------------------------------------------------------------    /78   Pulse 70   Resp 15   Ht 5' 6" (1 676 m)   Wt 63 5 kg (140 lb)   SpO2 98%   BMI 22 60 kg/m²   Wt Readings from Last 3 Encounters:   05/10/22 63 5 kg (140 lb)   11/30/21 65 4 kg (144 lb 3 2 oz)   09/16/21 71 7 kg (158 lb)     BP Readings from Last 3 Encounters:   05/10/22 122/78   11/30/21 120/78   09/16/21 118/62     Pulse Readings from Last 3 Encounters:   05/10/22 70   09/16/21 68   04/08/21 74     Body mass index is 22 6 kg/m²  Health Maintenance   Topic Date Due    COVID-19 Vaccine (1) Never done    Breast Cancer Screening: Mammogram  11/25/2021    Annual Physical  04/08/2022    HIV Screening  05/10/2024 (Originally 7/23/1985)    Hepatitis C Screening  06/10/2024 (Originally 1970)    Influenza Vaccine (Season Ended) 09/01/2022    BMI: Adult  11/30/2022    Cervical Cancer Screening  11/30/2023    DTaP,Tdap,and Td Vaccines (2 - Td or Tdap) 04/16/2028    Colorectal Cancer Screening  10/04/2028    Pneumococcal Vaccine: Pediatrics (0 to 5 Years) and At-Risk Patients (6 to 59 Years)  Aged Out    HIB Vaccine  Aged Out    Hepatitis B Vaccine  Aged Out    IPV Vaccine  Aged Out    Hepatitis A Vaccine  Aged Out    Meningococcal ACWY Vaccine  Aged Out    HPV Vaccine  Aged Out       ROS:   12 point review of systems negative            PHYSICAL EXAM:    Gen    No acute distress well-appearing well-nourished appears stated age    Mental status  Good judgment and insight oriented to time person and place, recent and remote memory intact mood and affect normal cooperative and patient is reasonable    HEENT  PERRLA 3 mm, EOMI without nystagmus, TMs clear, turbinates open pink no exudate, pharynx benign, tongue midline    Neck   supple no masses trachea midline positive click normal carotid upstrokes with no bruits    Cor  Regular rhythm without ectopy or murmur, no S3-S4, normal palpation that is no heave lift or thrill    Vascular  No edema, good pedal pulses    Lungs  CTA bilaterally in no respiratory distress no wheezes rhonchi or rales, normal to palpation no tactile fremitus    Abdomen  Soft, no palpable masses, no hepatosplenomegaly, normal bowel sounds, nontender    Lymphatics  No palpable nodes in the neck, supraclavicular area, axilla, or groin     Musculoskeletal  No clubbing cyanosis or edema muscle tone normal 12 point review of systems is negative    Skin  no rashes or abnormal appearing lesions    Neuro  Normal ambulation, cranial nerves 2-12 grossly intact, higher functioning with reasoning intact

## 2022-05-10 NOTE — PATIENT INSTRUCTIONS
Keep up the good work, watch foods high in cholesterol which only come from animals or milk so it is cheese in ice-cream and fatty cuts of beef and pork      Most important thing is to be aerobically fit    See you back in 1 year sooner if needed    If you are interested in the shingles vaccines please call us for an appointment

## 2022-05-13 ENCOUNTER — SOCIAL WORK (OUTPATIENT)
Dept: BEHAVIORAL/MENTAL HEALTH CLINIC | Facility: CLINIC | Age: 52
End: 2022-05-13
Payer: COMMERCIAL

## 2022-05-13 DIAGNOSIS — F40.10 SOCIAL ANXIETY DISORDER: ICD-10-CM

## 2022-05-13 DIAGNOSIS — F43.23 ADJUSTMENT DISORDER WITH MIXED ANXIETY AND DEPRESSED MOOD: Primary | ICD-10-CM

## 2022-05-13 DIAGNOSIS — F43.29 ADJUSTMENT DISORDER WITH DISTURBANCE OF EMOTION: ICD-10-CM

## 2022-05-13 PROCEDURE — 90834 PSYTX W PT 45 MINUTES: CPT | Performed by: SOCIAL WORKER

## 2022-05-13 NOTE — PSYCH
Psychotherapy Provided: Individual Psychotherapy 45 minutes     Length of time in session: 45 minutes, follow up in 2 week    Adjustment disorder with depression and anxiety, social anxiety    Goals addressed in session: Goal 2 and goal3     Pain:      moderate to severe    0    Current suicide risk : Low     Data:Lorrie arrived for the session  She discussed how the brother of her eldest son's best friend recently suicided and how this affected her son  She then discussed her addicted mentally ill youngest son and the cracks agency wise he is falling thru  Mendy Chandra discussed how this affects her anxiety and her goal of learning to accept what she can't control  Assessment: Mendy Chandra denies suicidal/homicidal ideation, plan or intent  However her anxiety is elevated over the issues with her addicted mentally ill son  I provided mindfulness and CBT  Plan: Continue to help her skill build in distress tolerance  Behavioral Health Treatment Plan [facility]: Diagnosis and Treatment Plan explained to Kassie Doran relates understanding diagnosis and is agreeable to Treatment Plan   Yes

## 2022-06-27 ENCOUNTER — SOCIAL WORK (OUTPATIENT)
Dept: BEHAVIORAL/MENTAL HEALTH CLINIC | Facility: CLINIC | Age: 52
End: 2022-06-27
Payer: COMMERCIAL

## 2022-06-27 DIAGNOSIS — F40.10 SOCIAL ANXIETY DISORDER: ICD-10-CM

## 2022-06-27 DIAGNOSIS — F43.23 ADJUSTMENT DISORDER WITH MIXED ANXIETY AND DEPRESSED MOOD: Primary | ICD-10-CM

## 2022-06-27 PROCEDURE — 90834 PSYTX W PT 45 MINUTES: CPT | Performed by: SOCIAL WORKER

## 2022-06-27 NOTE — PSYCH
Psychotherapy Provided: Individual Psychotherapy 45 minutes     Length of time in session: 45 minutes, follow up in 2 week    Adjustment disorder with anxiety and depression, social anxiety disorder    Goals addressed in session: goal 3    Pain:      moderate to severe    0    Current suicide risk : Low     Data:Today Edith Head discussed a recent trip she took with her brother and Niece to see her sister out in [place]  Her sister lives alone and has bipolar and can be difficult  The sister's son also was present with his son  Her nephew does not get along with her sister his mom but was there  Edith Head talked about the fact there were some good times but that there also was tension which caused her stress  Then she discussed her drug addicted son  She discussed how he is doing and how the rest of the family is affected  He is currently in a sober living house and he refuses mental health help  Then her other sone was over exposed to ozone at his place of employment and had to be hospitalized  Assessment: Edith Head denies suicidal/homicidal ideation, plan or intent  However she is focused on learning what is in her control and what isn't  She admits her anxiety is high and her work also has heavy demands on her  We worked on relaxation and stress management along with mindfulness  Plan: Continue to help her with her multiple stressors  Behavioral Health Treatment Plan [facility]: Diagnosis and Treatment Plan explained to Sarah Cunningham relates understanding diagnosis and is agreeable to Treatment Plan   Yes

## 2022-06-27 NOTE — BH TREATMENT PLAN
Seymour Kwok      Treatment Plan Tracking: The updated plan was due on the 15th of June  However her last session was 5/13  Today the 2th of June was the first time back so we did her update today  1970       Date of Initial Treatment Plan: 05/21/2020   Date of Current Treatment Plan: 06/27/22    Treatment Plan Number update     Strengths/Personal Resources for Self Care: strong values,caring, supportive    Diagnosis:   1  Adjustment disorder with mixed anxiety and depressed mood     2  Social anxiety disorder         Area of Needs: Please see below      Long Term Goal 1: I need to more effectively manage my anxiety  Target Date: 12/20/2022  Completion Date: TBD         Short Term Objectives for Goal 1: mindfulness and CBT    Long Term Goal 2: I still need to work on what I can control and what I cant and accept the difference    Target Date: 12/20/20222  Completion Date: TBD    Short Term Objectives for Goal 2: work on strategies to accept this in my sessions and at home         Long Term Goal # 3: N/A     Target Date: N/A  Completion Date: N/A    Short Term Objectives for Goal 3: N/A    GOAL 1: Modality: Individual 2x per month   Completion Date tbd    GOAL 2: Modality: Individual 2x per month   Completion Date tbd     GOAL 3: Modality: Individual n/ax per month   Completion Date n/a      [address]: Diagnosis and Treatment Plan explained to Prerna Arnold relates understanding diagnosis and is agreeable to Treatment Plan  Client Comments : Please share your thoughts, feelings, need and/or experiences regarding your treatment plan: My therapist and I will work as a team to help me progress on my goals

## 2022-07-20 ENCOUNTER — SOCIAL WORK (OUTPATIENT)
Dept: BEHAVIORAL/MENTAL HEALTH CLINIC | Facility: CLINIC | Age: 52
End: 2022-07-20
Payer: COMMERCIAL

## 2022-07-20 DIAGNOSIS — F43.23 ADJUSTMENT DISORDER WITH MIXED ANXIETY AND DEPRESSED MOOD: Primary | ICD-10-CM

## 2022-07-20 DIAGNOSIS — F40.10 SOCIAL ANXIETY DISORDER: ICD-10-CM

## 2022-07-20 PROCEDURE — 90834 PSYTX W PT 45 MINUTES: CPT | Performed by: SOCIAL WORKER

## 2022-07-20 NOTE — PSYCH
Psychotherapy Provided: Individual Psychotherapy 45 minutes     Length of time in session: 45 minutes, follow up in 2 week    Adjustment disorder with anxiety and depression, social anxiety disorder    Goals addressed in session: Goal 1, Goal 2 and Goal 3      Pain:      moderate to severe    0    Current suicide risk : Low     Data:Lorrie arrived for her session  She discussed her recent family trip  However she still is dealing with a mentally ill addicted son who is currently at a group home house  She discussed all the intricacies of his treatment and future placements  She discussed her issues at her place of employment  She will have to start going back to the office which is causing her anxiety   Her confidence she shared is low  She is anxious about new management that has been put in place  She is getting herself very anxious about her job and the new management  Assessment: Dorothea Caterina denies suicidal/homicidal ideation, plan or intent  However she is highly anxious  We discussed stress management, mindfulness and cognitive behavioral strategies  Plan: Continue to help her not write negative narratives before they happen  Behavioral Health Treatment Plan [facility]: Diagnosis and Treatment Plan explained to Roge Ashby relates understanding diagnosis and is agreeable to Treatment Plan   Yes

## 2022-08-18 ENCOUNTER — SOCIAL WORK (OUTPATIENT)
Dept: BEHAVIORAL/MENTAL HEALTH CLINIC | Facility: CLINIC | Age: 52
End: 2022-08-18
Payer: COMMERCIAL

## 2022-08-18 DIAGNOSIS — F40.10 SOCIAL ANXIETY DISORDER: ICD-10-CM

## 2022-08-18 DIAGNOSIS — F43.23 ADJUSTMENT DISORDER WITH MIXED ANXIETY AND DEPRESSED MOOD: Primary | ICD-10-CM

## 2022-08-18 PROCEDURE — 90834 PSYTX W PT 45 MINUTES: CPT | Performed by: SOCIAL WORKER

## 2022-08-18 NOTE — PSYCH
Psychotherapy Provided: Individual Psychotherapy 45 minutes     Length of time in session: 45 minutes, follow up in 2 week    Adjustment disorder with anxiety and depression  Goals addressed in session: Goal 1 and Goal 2     Pain:      moderate to severe    0    Current suicide risk : Low     Data: Danny Alcantar arrived for her session  She discussed her OCD has worsened  She discussed all the issues going on in her immediate family and with other relatives or friends of her adult children  She discussed she has to now go back to her office and she discussed how she is trying to manage her stress  She discussed the journey of her addicted son and the multiple placements he is in  He requires dual diagnosis programming  Assessment: Danny Alcantar is managing her anxiety to the best of her abilities and she is more effective at learning what she can control and what she cant  We continue to work on mindfulness, and CBT  Behavior[facility] Health [facility]: Diagnosis and Treatment Plan explained to Sam Fink relates understanding diagnosis and is agreeable to Treatment Plan   Yes

## 2022-10-12 ENCOUNTER — TELEPHONE (OUTPATIENT)
Dept: PSYCHIATRY | Facility: CLINIC | Age: 52
End: 2022-10-12

## 2022-10-12 NOTE — TELEPHONE ENCOUNTER
Writer left a voice message to inform pt appt on 10/21/22 with Trevor No will be cx due to provider is on vaction follow-up on 11/9/22

## 2022-10-24 ENCOUNTER — TELEPHONE (OUTPATIENT)
Dept: PSYCHIATRY | Facility: CLINIC | Age: 52
End: 2022-10-24

## 2022-11-09 ENCOUNTER — SOCIAL WORK (OUTPATIENT)
Dept: BEHAVIORAL/MENTAL HEALTH CLINIC | Facility: CLINIC | Age: 52
End: 2022-11-09

## 2022-11-09 DIAGNOSIS — F40.10 SOCIAL ANXIETY DISORDER: ICD-10-CM

## 2022-11-09 DIAGNOSIS — F43.23 ADJUSTMENT DISORDER WITH MIXED ANXIETY AND DEPRESSED MOOD: Primary | ICD-10-CM

## 2022-11-09 NOTE — PSYCH
Psychotherapy Provided: Individual Psychotherapy 50 minutes     Length of time in session: 50 minutes, follow up in 2 week    Encounter Diagnosis     ICD-10-CM    1  Adjustment disorder with mixed anxiety and depressed mood  F43 23    2  Social anxiety disorder  F40 10        Goals addressed in session: Goal 1, Goal 2 and Goal 3      Pain:      moderate to severe    0    Current suicide risk : Low     Data:Lorrie arrived for her session  She discussed all of her stressors since her last session  She discussed issues currently going on with her adult children and her spouse which can cause anxiety  However she is getting better with learning what is in her control and what is not  She also discussed some issues that bother her in the world today  Assessment: Martine Fofana denies suicidal/homicidal ideation, plan or intent  However she admits her anxiety is elevated due to family issues and issues in our current world  We worked on mindfulness and distress tolerance  Plan: Continue to help her skill build in distress tolerance  Behavioral Health Treatment Plan [facility]: Diagnosis and Treatment Plan explained to Brock Raw relates understanding diagnosis and is agreeable to Treatment Plan   Yes     Visit start and stop times:    11/09/22  Start Time: 0810  Stop Time: 0900  Total Visit Time: 50 minutes

## 2023-01-05 ENCOUNTER — SOCIAL WORK (OUTPATIENT)
Dept: BEHAVIORAL/MENTAL HEALTH CLINIC | Facility: CLINIC | Age: 53
End: 2023-01-05

## 2023-01-05 DIAGNOSIS — F43.23 ADJUSTMENT DISORDER WITH MIXED ANXIETY AND DEPRESSED MOOD: Primary | ICD-10-CM

## 2023-01-05 DIAGNOSIS — F40.10 SOCIAL ANXIETY DISORDER: ICD-10-CM

## 2023-01-05 NOTE — PSYCH
Psychotherapy Provided: Individual Psychotherapy 50 minutes     Length of time in session: 50 minutes, follow up in 2 week    Encounter Diagnosis     ICD-10-CM    1  Adjustment disorder with mixed anxiety and depressed mood  F43 23       2  Social anxiety disorder  F40 10           Goals addressed in session: Goal 1, Goal 2 and Goal 3      Pain:      moderate to severe    0    Current suicide risk : Low     Data: Madison Gallardo arrived for her session  She spent the entire session talking about her addicted/mental health involved son who is in early recovery  He actually is following thru with all recommendations both substance use wise and behavioral health wise  This in turn helps Lorrie's anxiety  Assessment: Madison Gallardo is less anxious and she is aware he could relapse but she has come a long way at accepting what she can control and what she can't  She denies suicidal/homicidal thoughts, plan or intent  We continue to work on mindfulness and CBT  Plan: Continue to help her draw and keep strong boundaries  Behavioral Health Treatment Plan [facility]: Diagnosis and Treatment Plan explained to Ricky Mae relates understanding diagnosis and is agreeable to Treatment Plan   Yes     Visit start and stop times:    01/05/23  Start Time: 0810  Stop Time: 0900  Total Visit Time: 50 minutes

## 2023-01-05 NOTE — BH TREATMENT PLAN
Gamal Marte                             Treatment plan tracking: The update was due 12/20/2023, however the patient was last here 11/09  Today the 5th of January is the first time back since 11/09  So we updated it today 11/09 1970       Date of Initial Treatment Plan: 05/21/2020  Date of Current Treatment Plan: 01/05/23    Treatment Plan Number update     Strengths/Personal Resources for Self Care: strong values, caring, supportive of others    Diagnosis:   1  Adjustment disorder with mixed anxiety and depressed mood        2  Social anxiety disorder            Area of Needs: please see below      Long Term Goal 1: I need to continue to better manage my anxiety  Target Date: 06/28/2023  Completion Date: TBD         Short Term Objectives for Goal 1: Mindfulness, CBT and solution focused therapy    Long Term Goal 2: I still am working on learning what I can control and what I can't  Target Date: 06/28/2023  Completion Date: TBD    Short Term Objectives for Goal 2: Mindfulness, radical acceptance         Long Term Goal # 3: n/a     Target Date: n/a  Completion Date: n/a    Short Term Objectives for Goal 3: N/A    GOAL 1: Modality: Individual 2x per month   Completion Date tbd    GOAL 2: Modality: Individual 2x per month   Completion Date tbd     GOAL 3: Modality: Individual n/ax per month   Completion Date n/a      Behavioral Health Treatment Plan [facility]: Diagnosis and Treatment Plan explained to Katherine Xie relates understanding diagnosis and is agreeable to Treatment Plan            Client Comments : Please share your thoughts, feelings, need and/or experiences regarding your treatment plan: My therapist and I will work as a team to help me progress on my goals

## 2023-01-05 NOTE — PSYCH
Psychotherapy Provided: Individual Psychotherapy 50 minutes     Length of time in session: 50 minutes, follow up in 2 week    Encounter Diagnosis     ICD-10-CM    1  Adjustment disorder with mixed anxiety and depressed mood  F43 23       2  Social anxiety disorder  F40 10           Goals addressed in session: Goal 1, Goal 2 and Goal 3      Pain:      moderate to severe    0    Current suicide risk : Low     Data:     Behavioral Health Treatment Plan [facility]: Diagnosis and Treatment Plan explained to Kevin Vidales relates understanding diagnosis and is agreeable to Treatment Plan   Yes     Visit start and stop times:    01/05/23  Start Time: 0810  Stop Time: 0900  Total Visit Time: 50 minutes

## 2023-01-27 ENCOUNTER — ANNUAL EXAM (OUTPATIENT)
Dept: OBGYN CLINIC | Facility: CLINIC | Age: 53
End: 2023-01-27

## 2023-01-27 VITALS
BODY MASS INDEX: 22.5 KG/M2 | SYSTOLIC BLOOD PRESSURE: 120 MMHG | WEIGHT: 140 LBS | DIASTOLIC BLOOD PRESSURE: 78 MMHG | HEIGHT: 66 IN

## 2023-01-27 DIAGNOSIS — N92.4 ABNORMAL PERIMENOPAUSAL BLEEDING: ICD-10-CM

## 2023-01-27 DIAGNOSIS — Z12.31 ENCOUNTER FOR SCREENING MAMMOGRAM FOR MALIGNANT NEOPLASM OF BREAST: ICD-10-CM

## 2023-01-27 DIAGNOSIS — Z01.419 ENCOUNTER FOR GYNECOLOGICAL EXAMINATION WITHOUT ABNORMAL FINDING: Primary | ICD-10-CM

## 2023-01-27 DIAGNOSIS — Z01.419 PAP SMEAR, AS PART OF ROUTINE GYNECOLOGICAL EXAMINATION: ICD-10-CM

## 2023-01-27 DIAGNOSIS — N95.2 ATROPHIC VAGINITIS: ICD-10-CM

## 2023-01-27 RX ORDER — MULTIVIT-MIN/IRON FUM/FOLIC AC 7.5 MG-4
1 TABLET ORAL DAILY
COMMUNITY

## 2023-01-27 NOTE — PATIENT INSTRUCTIONS
Normal gynecological physical examination  Self-breast examination stressed  Will obtain baseline pelvic US  Mammogram ordered  Discussed regular exercise, healthy diet, importance of vitamin D and calcium supplements  Discussed importance of sun block use during periods of prolonged sun exposure  Patient will be seen in 1 year for routine gynecologic and medical examination  Patient will call office for any problems, concerns, or issues which may arise during the interim    We will obtain baseline pelvic ultrasound to evaluate perimenopausal bleeding pattern

## 2023-01-27 NOTE — PROGRESS NOTES
Assessment/Plan:    No problem-specific Assessment & Plan notes found for this encounter  Diagnoses and all orders for this visit:    Encounter for gynecological examination without abnormal finding  -     Liquid-based pap, screening    Pap smear, as part of routine gynecological examination    Encounter for screening mammogram for malignant neoplasm of breast  -     Mammo screening bilateral w 3d & cad; Future    Atrophic vaginitis    Abnormal perimenopausal bleeding    Other orders  -     hydrocortisone 2 5 % ointment; SPARINGLY TWICE A DAY AS NEEDED --ITCHY LIPS  -     Multiple Vitamins-Minerals (multivitamin with minerals) tablet; Take 1 tablet by mouth daily        Normal gynecological physical examination  Self-breast examination stressed  Mammogram ordered  Will obtain baseline pelvic ultrasound  Discussed regular exercise, healthy diet, importance of vitamin D and calcium supplements  Discussed importance of sun block use during periods of prolonged sun exposure  Patient will be seen in 1 year for routine gynecologic and medical examination  Patient will call office for any problems, concerns, or issues which may arise during the interim  Subjective:   Patient presents to office today for annual wellness exam   Patient has no complaints at today's visit  HPI  Patient ID: Yin Arce is a 46 y o  female who presents today for her annual gynecologic and medical examination    Menstrual status: Perimenopausal   Patient gets period irregularly  Patient denies bleeding between periods  Patient notes exacerbation of PMS symptoms like mood fluctuations and breast pain  Patient reports primose oil aids in this  Will obtain baseline pelvic US to monitor endometrial stripe  Vasomotor symptoms: Denies hot flashes and insomnia      Patient reports normal appetite    Patient reports normal bowel and bladder habits    Patient denies any significant pelvic or abdominal pain    Patient denies any headaches, chest pain, shortness of breath fever shakes or chills    Patient denies any COVID 19 symptoms including cough or loss of taste or smell    COVID vaccine status: None    Medical problems: Follows with PCP regularly  Patient receives allergy shots and follows with allergist  Patient takes a variety of vitamins, but has no significant health concerns  Follows with psychiatry  Colonoscopy status: Up to date  Will obtain next colonoscopy every 10 years  Mammogram status: Last mammogram was in 2021  Patient will obtain updated mammogram this year  Importance of regular self breast exams was stressed to patient  The following portions of the patient's history were reviewed and updated as appropriate: allergies, current medications, past family history, past medical history, past social history, past surgical history and problem list     Review of Systems   Constitutional: Negative  Negative for appetite change, diaphoresis, fatigue, fever and unexpected weight change  HENT: Negative  Eyes: Negative  Respiratory: Negative  Cardiovascular: Negative  Gastrointestinal: Negative  Negative for abdominal pain, blood in stool, constipation, diarrhea, nausea and vomiting  Endocrine: Negative  Negative for cold intolerance and heat intolerance  Genitourinary: Negative  Negative for dysuria, frequency, hematuria, urgency, vaginal bleeding, vaginal discharge and vaginal pain  Musculoskeletal: Negative  Skin: Negative  Allergic/Immunologic: Negative  Followed for allergy shots   Neurological: Negative  Hematological: Negative  Negative for adenopathy  Psychiatric/Behavioral: Negative  Follows with psychiatry for adjustment disorder         Objective:  /78   Ht 5' 6" (1 676 m)   Wt 63 5 kg (140 lb)   LMP 01/12/2023 (Exact Date)   BMI 22 60 kg/m²      Physical Exam  Constitutional:       General: She is not in acute distress       Appearance: Normal appearance  She is well-developed  She is not diaphoretic  HENT:      Head: Normocephalic and atraumatic  Eyes:      Pupils: Pupils are equal, round, and reactive to light  Cardiovascular:      Rate and Rhythm: Normal rate and regular rhythm  Heart sounds: Normal heart sounds  No murmur heard  No friction rub  No gallop  Pulmonary:      Effort: Pulmonary effort is normal       Breath sounds: Normal breath sounds  Chest:   Breasts:     Breasts are symmetrical       Right: No inverted nipple, mass, nipple discharge, skin change or tenderness  Left: No inverted nipple, mass, nipple discharge, skin change or tenderness  Abdominal:      General: Bowel sounds are normal       Palpations: Abdomen is soft  Genitourinary:     Exam position: Supine  Labia:         Right: No rash or lesion  Left: No rash or lesion  Urethra: No urethral swelling or urethral lesion  Vagina: Normal  No vaginal discharge, erythema, tenderness or bleeding  Cervix: No discharge or friability  Uterus: Not enlarged and not tender  Adnexa:         Right: No mass, tenderness or fullness  Left: No mass, tenderness or fullness  Rectum: Normal  Guaiac result negative  Comments: Pelvic exam reveals good support  Mild atrophic vaginitis appreciated on exam   Musculoskeletal:         General: Normal range of motion  Cervical back: Normal range of motion and neck supple  Lymphadenopathy:      Cervical: No cervical adenopathy  Upper Body:      Right upper body: No supraclavicular adenopathy  Left upper body: No supraclavicular adenopathy  Skin:     General: Skin is warm and dry  Findings: No rash  Neurological:      General: No focal deficit present  Mental Status: She is alert and oriented to person, place, and time     Psychiatric:         Mood and Affect: Mood normal          Speech: Speech normal          Behavior: Behavior normal  Thought Content:  Thought content normal          Judgment: Judgment normal

## 2023-02-03 LAB
LAB AP GYN PRIMARY INTERPRETATION: NORMAL
Lab: NORMAL

## 2023-02-06 ENCOUNTER — ULTRASOUND (OUTPATIENT)
Dept: OBGYN CLINIC | Facility: CLINIC | Age: 53
End: 2023-02-06

## 2023-02-06 DIAGNOSIS — N93.9 ABNORMAL UTERINE BLEEDING (AUB): Primary | ICD-10-CM

## 2023-02-06 NOTE — PROGRESS NOTES
AMB US Pelvic Non OB    Date/Time: 2/6/2023 9:04 AM  Performed by: Nando Montes De Oca  Authorized by: Mitra Brown MD     Procedure details:     Indications: non-obstetric vaginal bleeding and intermenstrual blood loss      Technique:  US Pelvic, Non-OB with complete exam    Position: supine exam    Uterine findings:     Length (cm): 9 1    Height (cm):  6 9    Width (cm):  5 6    Uterine adhesions: not identified      Adnexal mass: not identified      Polyps: not identified      Myomas: not identified      Endometrial stripe: identified      Endometrial hyperplasia: not identified      Endometrium thickness (mm):  4  Left ovary findings:     Left ovary:  Visualized    Cysts: not identified      Length (cm): 3 2    Height (cm): 2 7    Width (cm): 2 5  Right ovary findings:     Right ovary:  Visualized    Cysts: not identified      Length (cm): 3 2    Height (cm): 2 8    Width (cm): 2  Other findings:     Free pelvic fluid: not identified      Free peritoneal fluid: not identified    Post-Procedure Details:     Impression:  Endo-4mm, WNL    Tolerance: Tolerated well, no immediate complications  Additional Procedure Comments:          Dr Dayo Hamlin MD  [address] [facility] [address]  [address]  [phone]

## 2023-02-06 NOTE — PROGRESS NOTES
Pelvic ultrasound findings were unremarkable    Uterine lining was normal    No ovarian cyst seen    Patient may return for routine follow-up as planned    Please have her call for any problems, questions, issues or concerns which may arise for her

## 2023-02-08 ENCOUNTER — SOCIAL WORK (OUTPATIENT)
Dept: BEHAVIORAL/MENTAL HEALTH CLINIC | Facility: CLINIC | Age: 53
End: 2023-02-08

## 2023-02-08 DIAGNOSIS — F43.23 ADJUSTMENT DISORDER WITH MIXED ANXIETY AND DEPRESSED MOOD: Primary | ICD-10-CM

## 2023-02-08 DIAGNOSIS — F40.10 SOCIAL ANXIETY DISORDER: ICD-10-CM

## 2023-02-08 NOTE — PSYCH
Behavioral Health Psychotherapy Progress Note    Psychotherapy Provided: Individual Psychotherapy     1  Adjustment disorder with mixed anxiety and depressed mood        2  Social anxiety disorder            Goals addressed in session: Goal 1 and Goal 2    DATA: Trish Mann arrived for her session  She is going to visit her son who is currently in a sober house  He is doing well but would benefit from a ICM  Her anxiety is less since her son is doing better  Trish Mann has progressed on accepting what she can control and what she can't  He may also benefit from a certified peer specialist  I provided the client Trish Mann with direction and resources for her son which also helped her decrease her anxiety  During this session, this clinician used the following therapeutic modalities: Client-centered Therapy, Cognitive Behavioral Therapy, Mindfulness-based Strategies and Supportive Psychotherapy    Substance Abuse was not addressed during this session  If the client is diagnosed with a co-occurring substance use disorder, please indicate any changes in the frequency or amount of use: n/a  Stage of change for addressing substance use diagnoses: No substance use/Not applicable    ASSESSMENT:  Hunter Dumas presents with a Euthymic/ normal mood  her affect is Normal range and intensity, which is congruent, with her mood and the content of the session  The client has made progress on their goals  Trish Mann is happy her son is doing well with his recovery  Hunter Dumas presents with a none risk of suicide, none risk of self-harm, and none risk of harm to others  For any risk assessment that surpasses a "low" rating, a safety plan must be developed  A safety plan was indicated: no  If yes, describe in detail n/a    PLAN: Between sessions, Hunter Dumas will continue to use mindfulness and CBT to help her manage any depression and anxiety as they arise     At the next session, the therapist will use Client-centered Therapy, Cognitive Behavioral Therapy, Mindfulness-based Strategies and Supportive Psychotherapy to address her symptoms  Behavioral Health Treatment Plan and Discharge Planning: Samuel Reyes is aware of and agrees to continue to work on their treatment plan  They have identified and are working toward their discharge goals   yes    Visit start and stop times:    02/08/23  Start Time: 0810  Stop Time: 0900  Total Visit Time: 50 minutes

## 2023-02-13 ENCOUNTER — TELEPHONE (OUTPATIENT)
Dept: OBGYN CLINIC | Facility: CLINIC | Age: 53
End: 2023-02-13

## 2023-03-08 ENCOUNTER — SOCIAL WORK (OUTPATIENT)
Dept: BEHAVIORAL/MENTAL HEALTH CLINIC | Facility: CLINIC | Age: 53
End: 2023-03-08

## 2023-03-08 DIAGNOSIS — F40.10 SOCIAL ANXIETY DISORDER: ICD-10-CM

## 2023-03-08 DIAGNOSIS — F43.23 ADJUSTMENT DISORDER WITH MIXED ANXIETY AND DEPRESSED MOOD: Primary | ICD-10-CM

## 2023-03-08 NOTE — PSYCH
Behavioral Health Psychotherapy Progress Note    Psychotherapy Provided: Individual Psychotherapy     1  Adjustment disorder with mixed anxiety and depressed mood        2  Social anxiety disorder            Goals addressed in session: Goal 1, Goal 2 and Goal 3      DATA: Jon Baxter continues to deal with anxiety and she is trying to realize and accept what she can control and what she can't  Most of this is in regards to her son and his issues  He is living in a sober living house  Jon Baxter continues to deal with her son who has severe behavioral health issues and addiction issues  I recommended a community living arrangement group home  During this session, this clinician used the following therapeutic modalities: Client-centered Therapy, Cognitive Behavioral Therapy, Mindfulness-based Strategies and Supportive Psychotherapy    Substance Abuse was not addressed during this session  If the client is diagnosed with a co-occurring substance use disorder, please indicate any changes in the frequency or amount of use: n/a  Stage of change for addressing substance use diagnoses: No substance use/Not applicable    ASSESSMENT:  Sherri Pizarro presents with a Anxious mood  her affect is anxious, which is congruent, with her mood and the content of the session  The client has made progress on their goals  Jon Baxter continues to be anxious  Sherri Pizarro presents with a none risk of suicide, none risk of self-harm, and none risk of harm to others  For any risk assessment that surpasses a "low" rating, a safety plan must be developed  A safety plan was indicated: no  If yes, describe in detail n/a    PLAN: Between sessions, Sherri Pizarro will use mindfulness and CBT  At the next session, the therapist will use Client-centered Therapy, Cognitive Behavioral Therapy, Mindfulness-based Strategies and Supportive Psychotherapy to address symptoms and issues as they arrive       [address] and Discharge Planning: Troy Figueroa is aware of and agrees to continue to work on their treatment plan  They have identified and are working toward their discharge goals   yes    Visit start and stop times:    03/08/23  Start Time: 0810  Stop Time: 0900  Total Visit Time: 50 minutes

## 2023-03-27 ENCOUNTER — TELEPHONE (OUTPATIENT)
Dept: OBGYN CLINIC | Facility: CLINIC | Age: 53
End: 2023-03-27

## 2023-03-27 DIAGNOSIS — N39.3 STRESS INCONTINENCE: Primary | ICD-10-CM

## 2023-03-27 NOTE — TELEPHONE ENCOUNTER
Please place referral for physical therapy for pelvic floor strengthening    pelvic relaxation    Please leave message for patient that it has been placed    Thanks

## 2023-05-02 ENCOUNTER — OFFICE VISIT (OUTPATIENT)
Dept: PHYSICAL THERAPY | Facility: REHABILITATION | Age: 53
End: 2023-05-02

## 2023-05-02 DIAGNOSIS — R39.15 URINARY URGENCY: Primary | ICD-10-CM

## 2023-05-02 DIAGNOSIS — N81.89 PELVIC FLOOR WEAKNESS: ICD-10-CM

## 2023-05-02 DIAGNOSIS — N39.41 URGE URINARY INCONTINENCE: ICD-10-CM

## 2023-05-02 NOTE — PROGRESS NOTES
PT Evaluation     Today's date: 2023  Patient name: Donavan Avalos  : 1970  MRN: [id number]  Referring provider: ANDRE Perez  Dx:   Encounter Diagnosis     ICD-10-CM    1  Urinary urgency  R39 15       2  Urge urinary incontinence  N39 41           Start Time: 0810  Stop Time: 0900  Total time in clinic (min): 50 minutes    Assessment  Assessment details: Donavan Avalos is a 46 y o  female who presents with concerns of urinary urgency and urge urinary incontinence  Exam findings reveal mild pelvic floor muscle weakness and poor coordination with deep core stabilizers      The plan of care was discussed and included education regarding pelvic floor anatomy, explanation of exam technique, explanation of exam findings and discussion of treatment plan as well as the importance of patient compliance and adherence to physical therapy visits  Patient would benefit from skilled physical therapy services  to address deficits and ultimately meet goal of independent self management of condition      Patient provided written and verbal consent for pelvic floor muscle exam: Yes    Examination and/or treatment provided by JULIETTE Doe under direct supervision by Mago Hightower PT, MSPT  Impairments: abnormal muscle firing, abnormal muscle tone, activity intolerance and lacks appropriate home exercise program  Understanding of Dx/Px/POC: good   Prognosis: good    Goals  STGs to be met in 6 weeks:  * Patient will be compliant with introductory HEP as prescribed  * Presents with improved understanding of bladder irritants in diet and makes concerted effort to reduce them by at least 50-75%  LTGs to be met by discharge:  * Patient will present with a 75 % improvement on FOTO score by discharge to indicate improved symptom management  * Patient reports that she is able to successfully suppress an urge to empty her bladder for 20' without leakage     * Presents with improved fluid intake to avoid concentrated and irritating urine by discharge  * Presents with improved nocturia to 0 toiletings/night for more thorough sleep  * Patient implements urge suppression strategies throughout the day and reports that her average voiding interval is 2+ hours upon discharge  * Patient will be compliant with comprehensive home exercise program for self management of condition  Plan  Patient would benefit from: skilled physical therapy  Planned therapy interventions: breathing training, home exercise program, joint mobilization, manual therapy, neuromuscular re-education, patient education, self care, strengthening, stretching, therapeutic activities and therapeutic exercise  Frequency: 1x week  Duration in weeks: 12  Plan of Care beginning date: 2023  Plan of Care expiration date: 2023  Treatment plan discussed with: patient        PT Pelvic Floor Subjective:   History of Present Illness:   Pt is 46 y o f  who is  who reports that she has been experiencing urinary urgency and urge urinary incontinence that started to come back 6 months ago  It had been going on for many years before, which she came to pelvic PT for in  which improved her symptoms however it started to get worse again recently  She learned a lot of great information and exercises during physical therapy the last time, and she is returning because she wants a refresher on what she learned and a simplified plan that she knows she can stick to that she can make work to get rid of her current symptoms and prevent them from coming back  Social Support:     Work status: employed full time (, 5 days/week, 40 hours)  Diet and Exercise:      5x/week 15-20 minutes with weight training and stretching  Tries to walk everyday either for 20-45 minutes or takes several short walks during the day that are each about 5-10 minutes long    OB/ gyn History    Gestational History:     Prior Pregnancy: Yes Number of prior pregnancies: 4    Number of term pregnancies: 3    Delivery Type: vaginal delivery and  section      Number of vaginal deliveries: 2    Number of caesarian sections: 1    Delivery Complications:  First two deliveries were vaginal and ast delivery was   Second baby was 10 lbs  Ages of children: 32, 25, and 24  Menstrual History:      Painful periods:  No difficulty managing menstrual pain  She is perimenopausal  Period is unpredictable right now  Leakage tends to worsen when she is on her period  Last period was 21 days ago on Saturday  Medium flow, maybe occasionally a little heavier  Occasionally may have what feels like a hot flash but not sure, and her PMS symptoms seem to increase significantly now  She will get very hungry and gets upset more quickly  Bladder Function:     Voiding Difficulties positive for: urgency and frequent urination      Voiding Difficulties comments:     Voiding frequency: every 3-4 hours and every 1-2 hours (When she has urgency issues she goes to the bathroom every hour at times )    Urinary leakage: urine leakage    Urinary leakage aggravated by: key-in-the-door syndrome    Nocturia (episodes per night): 1 and 2 (When she drinks her tea)    Painful urination: No      Fluid Intake Type: Water, coffee and tea    Intake (ounces): Water: 60, Coffee: 8, Tea: 16,     Intake (ounces) comment: Triggers are stress and situational  Sometimes leaks when she waits too long to go to the bathroom when she thinks she can wait a little longer  If she drinks tea before bed, she wakes up in the middle of the night to use the bathroom  Has 1-2 cups of herbal tea or decaf black tea  And occasionally has teachino  Defensively voids  Sometimes gets urgency when she gets home from somewhere    Incontinence Management:     Pads/Diaper Use:  Day  Bowel Function:     Bowel frequency: daily    Moca Stool Scale: type 4  Sexual Function:     Sexually Active:  Sexually active and single partner    Pain during intercourse: No    Treatments:     Previous treatment:  Physical therapy  Patient Goals:     Patient goals for therapy:  Improved bladder or bowel function, improved quality of life, improved sleep and improved comfort    Other patient goals:  Doesn't want to leak at her son's wedding in one month  Objective   Pelvic Floor Exam     General Perineum Exam:   perineum intact  Positive for gaping introitus  General perineum exam comments: No discharge, irritation, organ prolapse or skin breakdown evident    Tissue laxity evident along labia minora/majora    Visual Inspection of Perineum:   Cotton swab test: non-tender  Cough reflex: cough reflex  Sphincter Tone Resting: normal  Sphincter Tone Squeeze: normal  Sensation: intact    Pelvic Organ Prolapse   no pelvic organ prolapse    Pelvic Floor Muscle Exam     Muscle Contraction: well isolated  Breathing pattern with contraction: within normal limits  Pelvic floor muscle relaxation is incomplete     75% pelvic floor relaxation    PERFECT Score   Power right: 3+/5  Power left: 3+/5  Endurance (seconds to max): 10  Repetitions (before fatigue): 2               Precautions:  Patient Active Problem List   Diagnosis    Allergic arthritis    Adjustment disorder with mixed anxiety and depressed mood    Status post     Overactive bladder    Multiple environmental allergies    Vitamin D deficiency       Manuals                                                                 Neuro Re-Ed                                                                                                        Ther Ex             PFM + breath             PFM+TRA               Bridge + abd             Sit to stand             Seated OH press                                                    Ther Activity             Education Examination findings & POC 10' Fluid recs                        Gait Training Modalities

## 2023-05-09 ENCOUNTER — OFFICE VISIT (OUTPATIENT)
Dept: PHYSICAL THERAPY | Facility: REHABILITATION | Age: 53
End: 2023-05-09

## 2023-05-09 DIAGNOSIS — N39.41 URGE URINARY INCONTINENCE: ICD-10-CM

## 2023-05-09 DIAGNOSIS — N81.89 PELVIC FLOOR WEAKNESS: ICD-10-CM

## 2023-05-09 DIAGNOSIS — R39.15 URINARY URGENCY: Primary | ICD-10-CM

## 2023-05-09 NOTE — PROGRESS NOTES
Daily Note     Today's date: 2023  Patient name: Jarad Claros  : 1970  MRN: [id number]  Referring provider: ANDRE Barger  Dx:   Encounter Diagnosis     ICD-10-CM    1  Urinary urgency  R39 15       2  Urge urinary incontinence  N39 41       3  Pelvic floor weakness  N81 89           Pt is 46 y o f  who is  who reports that she has been experiencing urinary urgency and urge urinary incontinence that started to come back 6 months ago  It had been going on for many years before, which she came to pelvic PT for in  which improved her symptoms however it started to get worse again recently      She learned a lot of great information and exercises during physical therapy the last time, and she is returning because she wants a refresher on what she learned and a simplified plan that she knows she can stick to that she can make work to get rid of her current symptoms and prevent them from coming back  Start Time: 0800  Stop Time: 0900  Total time in clinic (min): 60 minutes    Subjective: Pt reports that her symptoms have not worsened since her IE last week  Objective: See treatment diary below    Access Code: [email or web address]  URL: [email or web address]  Date: 2023  Prepared by: Swati Peguero    Exercises  - Relaxation focused PF exercise  - 2 x daily - 7 x weekly  - Supine Bridge with Pelvic Floor Contraction  - 2 x daily - 7 x weekly      Assessment: Tolerated treatment well  Patient would benefit from continued PT  Educated pt on trying to limit her bladder irritants for the next few weeks until she begins to notice progress in her ability to defer her urge and control her bladder  Also educated her on maintaining a 2:1 non-irritant to irritant ratio to decrease how irritating her urine is, how to defer her urge, and normal bladder habits   Pt required moderate amount of VCs for proper coordination of DB + PFM + TRA, and required a significant amount of review about how to combine all these  Pt was very mindful when performing her exercises today, and was very thoughtful about how to perform each of them  Began having pt perform bridges with hip abduction, but it appeared that there were quite too many steps for her to coordinate so had her perform a regular bridge instead  Treatment provided by Deep Johns, SPT under direct supervision by Kyra Herman, PT, MSPT  Plan: Continue per plan of care  Continue to add simple strengthening exercises with PFM and TRA co-contraction as able       Precautions:  Patient Active Problem List   Diagnosis   • Allergic arthritis   • Adjustment disorder with mixed anxiety and depressed mood   • Status post    • Overactive bladder   • Multiple environmental allergies   • Vitamin D deficiency       Manuals  5                                                               Neuro Re-Ed                                                                                                        Ther Ex             Treadmill for aerobic and muscle warmup  8'           PFM + breath  8'           PFM+TRA    8'           Bridge + abd  1x4           Sit to stand             Seated OH press             Bridge  2x10                                     Ther Activity             Education Examination findings & POC 10' Fluid recs, urge deferral, normal bladder habits, & bladder irritants 20'                        Gait Training                                       Modalities

## 2023-05-16 ENCOUNTER — OFFICE VISIT (OUTPATIENT)
Dept: PHYSICAL THERAPY | Facility: REHABILITATION | Age: 53
End: 2023-05-16

## 2023-05-16 DIAGNOSIS — N39.41 URGE URINARY INCONTINENCE: ICD-10-CM

## 2023-05-16 DIAGNOSIS — R39.15 URINARY URGENCY: Primary | ICD-10-CM

## 2023-05-16 NOTE — PROGRESS NOTES
Daily Note     Today's date: 2023  Patient name: Kristin Evangelista  : 1970  MRN: [id number]  Referring provider: ANDRE Hurst  Dx:   Encounter Diagnosis     ICD-10-CM    1  Urinary urgency  R39 15       2  Urge urinary incontinence  N39 41           Pt is 46 y o f  who is  who reports that she has been experiencing urinary urgency and urge urinary incontinence that started to come back 6 months ago  It had been going on for many years before, which she came to pelvic PT for in  which improved her symptoms however it started to get worse again recently      She learned a lot of great information and exercises during physical therapy the last time, and she is returning because she wants a refresher on what she learned and a simplified plan that she knows she can stick to that she can make work to get rid of her current symptoms and prevent them from coming back  Start Time: 0800  Stop Time: 0850  Total time in clinic (min): 50 minutes    Subjective: Pt reminded me she is not interested in biofeedback  Her goal is to understand what exercises she needs to incorporate and how often  Bladder diaries    Day 1  Day 2  Water:  86oz  78oz    Irritants: 20oz  18oz  Voided: 7  9  Nocturia: 1  0  Pads  2  0    Objective: See treatment diary below      Access Code: [email or web address]  URL: [email or web address]  Date: 2023  Prepared by: Thuan Waite    Program Notes  With all strengthening exercises, please exhale and engage your pelvic floor muscles and lower abs with the effort      Exercises  - Relaxation focused PF exercise  - 2 x daily - 7 x weekly  - Supine Bridge with Pelvic Floor Contraction  - 2 x daily - 7 x weekly  - Standing Bicep Curls with Resistance  - 1 x daily - 7 x weekly - 2 sets - 10 reps - 5 hold  - Standing Bent Over Triceps Extension  - 1 x daily - 7 x weekly - 2 sets - 10 reps - 5 hold  - Standing Diagonal Chop  - 1 x daily - 7 x weekly - 2 sets - 10 reps - 5 hold  - "Reverse Chop with Resistance  - 1 x daily - 7 x weekly - 2 sets - 10 reps - 5 hold  - Chest Press with Resistance  - 1 x daily - 7 x weekly - 2 sets - 10 reps - 5 hold  - Full Plank  - 1 x daily - 7 x weekly - 2 sets - 10 reps - 5 hold      Assessment: Tolerated treatment well  Patient would benefit from continued PT  We discussed what she is currently doing exercise wise and incorporating PFM with breath for upper body exercises, will add lower body next session  Pt demonstrates good understanding and form  Reminded to be mindful of relaxing her pelvic floor as well  Plan: Continue per plan of care        Precautions:  Patient Active Problem List   Diagnosis   • Allergic arthritis   • Adjustment disorder with mixed anxiety and depressed mood   • Status post    • Overactive bladder   • Multiple environmental allergies   • Vitamin D deficiency       Manuals                                                               Neuro Re-Ed             Upper body exercises w/ PFM contraction   bicep curls 5#  Bent over triceos 5#  chest press RCO  chopped/pull YCO  2x10 of each          planks   On hands 30\"x3 w/ baby kegel                                                                           Ther Ex             Treadmill for aerobic and muscle warmup  8' 8'          PFM + breath  8'           PFM+TRA    8'           Bridge + abd  1x4           Sit to stand             Seated OH press             Bridge  2x10                                     Ther Activity             Education Examination findings & POC 10' Fluid recs, urge deferral, normal bladder habits, & bladder irritants 20' returned bladder diaries 8' discussed                       Gait Training                                       Modalities                                            "

## 2023-06-05 ENCOUNTER — OFFICE VISIT (OUTPATIENT)
Dept: PHYSICAL THERAPY | Facility: REHABILITATION | Age: 53
End: 2023-06-05
Payer: COMMERCIAL

## 2023-06-05 DIAGNOSIS — N81.89 PELVIC FLOOR WEAKNESS: ICD-10-CM

## 2023-06-05 DIAGNOSIS — R39.15 URINARY URGENCY: Primary | ICD-10-CM

## 2023-06-05 DIAGNOSIS — N39.41 URGE URINARY INCONTINENCE: ICD-10-CM

## 2023-06-05 PROCEDURE — 97530 THERAPEUTIC ACTIVITIES: CPT

## 2023-06-05 NOTE — PROGRESS NOTES
Daily Note / Discharge note    Today's date: 2023  Patient name: Brown Jacobo  : 1970  MRN: [id number]  Referring provider: Edra Soulier, P*  Dx:   Encounter Diagnosis     ICD-10-CM    1  Urinary urgency  R39 15       2  Urge urinary incontinence  N39 41       3  Pelvic floor weakness  N81 89           Pt is 46 y o f  who is  who reports that she has been experiencing urinary urgency and urge urinary incontinence that started to come back 6 months ago  It had been going on for many years before, which she came to pelvic PT for in  which improved her symptoms however it started to get worse again recently      She learned a lot of great information and exercises during physical therapy the last time, and she is returning because she wants a refresher on what she learned and a simplified plan that she knows she can stick to that she can make work to get rid of her current symptoms and prevent them from coming back  Start Time: 805  Stop Time: 830  Total time in clinic (min): 25 minutes    Subjective: Pt feels ready to be discharged at this time  Feels the strategies are making a difference, continues to be consistent with her exercises twice daily  Objective: See treatment diary below    Access Code: [email or web address]  URL: [email or web address]  Date: 2023  Prepared by: Prince Palumbo    Program Notes  With all strengthening exercises, please exhale and engage your pelvic floor muscles and lower abs with the effort      Exercises  - Relaxation focused PF exercise  - 2 x daily - 7 x weekly  - Supine Bridge with Pelvic Floor Contraction  - 2 x daily - 7 x weekly  - Standing Bicep Curls with Resistance  - 1 x daily - 7 x weekly - 2 sets - 10 reps - 5 hold  - Standing Bent Over Triceps Extension  - 1 x daily - 7 x weekly - 2 sets - 10 reps - 5 hold  - Standing Diagonal Chop  - 1 x daily - 7 x weekly - 2 sets - 10 reps - 5 hold  - Reverse Chop with Resistance  - 1 x daily - 7 x "weekly - 2 sets - 10 reps - 5 hold  - Chest Press with Resistance  - 1 x daily - 7 x weekly - 2 sets - 10 reps - 5 hold  - Full Plank  - 1 x daily - 7 x weekly - 2 sets - 10 reps - 5 hold      Assessment: Primary PT was in agreement with Otilia Rosa being discharged  Reviewed maintenance program with her in addition to some bladder physiology  Pt deferred exercises  Goals  STGs to be met in 6 weeks:  * Patient will be compliant with introductory HEP as prescribed  GOAL MET  * Presents with improved understanding of bladder irritants in diet and makes concerted effort to reduce them by at least 50-75%  GOAL MET    LTGs to be met by discharge:  * Patient will present with a 75 % improvement on FOTO score by discharge to indicate improved symptom management  * Patient reports that she is able to successfully suppress an urge to empty her bladder for 20' without leakage  PROGRESSING AS OF   * Presents with improved fluid intake to avoid concentrated and irritating urine by discharge  GOAL MET  * Presents with improved nocturia to 0 toiletings/night for more thorough sleep  GOAL MET  * Patient implements urge suppression strategies throughout the day and reports that her average voiding interval is 2+ hours upon discharge GOAL MET  * Patient will be compliant with comprehensive home exercise program for self management of condition  GOAL MET     Plan: Discontinue POC       Precautions:  Patient Active Problem List   Diagnosis   • Allergic arthritis   • Adjustment disorder with mixed anxiety and depressed mood   • Status post    • Overactive bladder   • Multiple environmental allergies   • Vitamin D deficiency       Manuals                                                              Neuro Re-Ed             Upper body exercises w/ PFM contraction   bicep curls 5#  Bent over triceos 5#  chest press RCO  chopped/pull YCO  2x10 of each          planks   On hands 30\"x3 w/ baby kegel        " Ther Ex             Treadmill for aerobic and muscle warmup  8' 8'          PFM + breath  8'           PFM+TRA    8'           Bridge + abd  1x4           Sit to stand             Seated OH press             Bridge  2x10                                     Ther Activity             Education Examination findings & POC 10' Fluid recs, urge deferral, normal bladder habits, & bladder irritants 20' returned bladder diaries 8' discussed review of exercises, maintenance program, 25'                      Gait Training                                       Modalities

## 2023-06-08 ENCOUNTER — SOCIAL WORK (OUTPATIENT)
Dept: BEHAVIORAL/MENTAL HEALTH CLINIC | Facility: CLINIC | Age: 53
End: 2023-06-08

## 2023-06-08 DIAGNOSIS — F40.10 SOCIAL ANXIETY DISORDER: ICD-10-CM

## 2023-06-08 DIAGNOSIS — F43.23 ADJUSTMENT DISORDER WITH MIXED ANXIETY AND DEPRESSED MOOD: Primary | ICD-10-CM

## 2023-06-08 NOTE — PSYCH
"Behavioral Health Psychotherapy Progress Note    Psychotherapy Provided: Individual Psychotherapy     1  Adjustment disorder with mixed anxiety and depressed mood        2  Social anxiety disorder            Goals addressed in session: Goal 1 and Goal 2     DATA: Christiano Morgan arrived for her session  She spent time talking about her one son's recent wedding  He drug addicted son actually went and in her words did well at the wedding so she remembers it as a good day  The rest of the session she discussed her son and his situation in a sober living house and what she is doing emotionally for herself to cope  I provided support and insight and strategies to cope  During this session, this clinician used the following therapeutic modalities: Client-centered Therapy, Cognitive Behavioral Therapy, Mindfulness-based Strategies and Supportive Psychotherapy    Substance Abuse was not addressed during this session  If the client is diagnosed with a co-occurring substance use disorder, please indicate any changes in the frequency or amount of use: n/a  Stage of change for addressing substance use diagnoses: No substance use/Not applicable    ASSESSMENT:  Yasmeen Redding presents with a Euthymic/ normal mood  her affect is Normal range and intensity, which is congruent, with her mood and the content of the session  The client has made progress on their goals  Yasmeen Redding presents with a none risk of suicide, none risk of self-harm, and none risk of harm to others  For any risk assessment that surpasses a \"low\" rating, a safety plan must be developed  A safety plan was indicated: no  If yes, describe in detail n/a    PLAN: Between sessions, Yasmeen Redding will use mindfulness and CBT  At the next session, the therapist will use Client-centered Therapy, Cognitive Behavioral Therapy, Mindfulness-based Strategies and Supportive Psychotherapy to address issues and symptoms as they may arise       Behavioral Health " Treatment Plan and Discharge Planning: Na Salazar is aware of and agrees to continue to work on their treatment plan  They have identified and are working toward their discharge goals   yes    Visit start and stop times:    06/08/23  Start Time: 0810  Stop Time: 0900  Total Visit Time: 50 minutes No pertinent past medical history

## 2023-06-19 ENCOUNTER — APPOINTMENT (OUTPATIENT)
Dept: LAB | Facility: HOSPITAL | Age: 53
End: 2023-06-19
Payer: COMMERCIAL

## 2023-06-19 ENCOUNTER — OFFICE VISIT (OUTPATIENT)
Dept: FAMILY MEDICINE CLINIC | Facility: HOSPITAL | Age: 53
End: 2023-06-19
Payer: COMMERCIAL

## 2023-06-19 VITALS
BODY MASS INDEX: 22.24 KG/M2 | HEIGHT: 66 IN | WEIGHT: 138.4 LBS | DIASTOLIC BLOOD PRESSURE: 60 MMHG | HEART RATE: 62 BPM | TEMPERATURE: 97.3 F | OXYGEN SATURATION: 99 % | SYSTOLIC BLOOD PRESSURE: 122 MMHG

## 2023-06-19 DIAGNOSIS — Z13.0 SCREENING FOR ENDOCRINE, METABOLIC AND IMMUNITY DISORDER: ICD-10-CM

## 2023-06-19 DIAGNOSIS — Z13.228 SCREENING FOR ENDOCRINE, METABOLIC AND IMMUNITY DISORDER: ICD-10-CM

## 2023-06-19 DIAGNOSIS — Z13.29 SCREENING FOR ENDOCRINE, METABOLIC AND IMMUNITY DISORDER: ICD-10-CM

## 2023-06-19 DIAGNOSIS — Z00.00 ANNUAL PHYSICAL EXAM: Primary | ICD-10-CM

## 2023-06-19 DIAGNOSIS — Z13.220 SCREENING CHOLESTEROL LEVEL: ICD-10-CM

## 2023-06-19 DIAGNOSIS — E55.9 VITAMIN D DEFICIENCY: ICD-10-CM

## 2023-06-19 LAB
ALBUMIN SERPL BCP-MCNC: 3.8 G/DL (ref 3.5–5)
ALP SERPL-CCNC: 36 U/L (ref 46–116)
ALT SERPL W P-5'-P-CCNC: 26 U/L (ref 12–78)
ANION GAP SERPL CALCULATED.3IONS-SCNC: 3 MMOL/L (ref 4–13)
AST SERPL W P-5'-P-CCNC: 21 U/L (ref 5–45)
BASOPHILS # BLD AUTO: 0.04 THOUSANDS/ÂΜL (ref 0–0.1)
BASOPHILS NFR BLD AUTO: 1 % (ref 0–1)
BILIRUB SERPL-MCNC: 0.87 MG/DL (ref 0.2–1)
BUN SERPL-MCNC: 9 MG/DL (ref 5–25)
CALCIUM SERPL-MCNC: 9.3 MG/DL (ref 8.3–10.1)
CHLORIDE SERPL-SCNC: 108 MMOL/L (ref 96–108)
CHOLEST SERPL-MCNC: 238 MG/DL
CO2 SERPL-SCNC: 29 MMOL/L (ref 21–32)
CREAT SERPL-MCNC: 0.67 MG/DL (ref 0.6–1.3)
EOSINOPHIL # BLD AUTO: 0.1 THOUSAND/ÂΜL (ref 0–0.61)
EOSINOPHIL NFR BLD AUTO: 3 % (ref 0–6)
ERYTHROCYTE [DISTWIDTH] IN BLOOD BY AUTOMATED COUNT: 11.9 % (ref 11.6–15.1)
GFR SERPL CREATININE-BSD FRML MDRD: 101 ML/MIN/1.73SQ M
GLUCOSE P FAST SERPL-MCNC: 99 MG/DL (ref 65–99)
HCT VFR BLD AUTO: 41.5 % (ref 34.8–46.1)
HDLC SERPL-MCNC: 86 MG/DL
HGB BLD-MCNC: 13.5 G/DL (ref 11.5–15.4)
IMM GRANULOCYTES # BLD AUTO: 0 THOUSAND/UL (ref 0–0.2)
IMM GRANULOCYTES NFR BLD AUTO: 0 % (ref 0–2)
LDLC SERPL CALC-MCNC: 143 MG/DL (ref 0–100)
LYMPHOCYTES # BLD AUTO: 1.42 THOUSANDS/ÂΜL (ref 0.6–4.47)
LYMPHOCYTES NFR BLD AUTO: 39 % (ref 14–44)
MCH RBC QN AUTO: 30.3 PG (ref 26.8–34.3)
MCHC RBC AUTO-ENTMCNC: 32.5 G/DL (ref 31.4–37.4)
MCV RBC AUTO: 93 FL (ref 82–98)
MONOCYTES # BLD AUTO: 0.3 THOUSAND/ÂΜL (ref 0.17–1.22)
MONOCYTES NFR BLD AUTO: 8 % (ref 4–12)
NEUTROPHILS # BLD AUTO: 1.81 THOUSANDS/ÂΜL (ref 1.85–7.62)
NEUTS SEG NFR BLD AUTO: 49 % (ref 43–75)
NONHDLC SERPL-MCNC: 152 MG/DL
NRBC BLD AUTO-RTO: 0 /100 WBCS
PLATELET # BLD AUTO: 204 THOUSANDS/UL (ref 149–390)
PMV BLD AUTO: 11.6 FL (ref 8.9–12.7)
POTASSIUM SERPL-SCNC: 4.1 MMOL/L (ref 3.5–5.3)
PROT SERPL-MCNC: 7.4 G/DL (ref 6.4–8.4)
RBC # BLD AUTO: 4.46 MILLION/UL (ref 3.81–5.12)
SODIUM SERPL-SCNC: 140 MMOL/L (ref 135–147)
TRIGL SERPL-MCNC: 46 MG/DL
WBC # BLD AUTO: 3.67 THOUSAND/UL (ref 4.31–10.16)

## 2023-06-19 PROCEDURE — 36415 COLL VENOUS BLD VENIPUNCTURE: CPT

## 2023-06-19 PROCEDURE — 80053 COMPREHEN METABOLIC PANEL: CPT

## 2023-06-19 PROCEDURE — 82306 VITAMIN D 25 HYDROXY: CPT

## 2023-06-19 PROCEDURE — 80061 LIPID PANEL: CPT

## 2023-06-19 PROCEDURE — 85025 COMPLETE CBC W/AUTO DIFF WBC: CPT

## 2023-06-19 PROCEDURE — 99396 PREV VISIT EST AGE 40-64: CPT | Performed by: NURSE PRACTITIONER

## 2023-06-19 NOTE — PROGRESS NOTES
[facility] [address] [address]     NAME: Mo Mendiola  AGE: 46 y o  SEX: female  : 1970     DATE: 2023     Assessment and Plan:     Problem List Items Addressed This Visit        Other    Vitamin D deficiency    Relevant Orders    Vitamin D 25 hydroxy   Other Visit Diagnoses     Annual physical exam    -  Primary    Screening cholesterol level        Relevant Orders    Lipid panel    Screening for endocrine, metabolic and immunity disorder        Relevant Orders    CBC and differential    Comprehensive metabolic panel          Immunizations and preventive care screenings were discussed with patient today  Appropriate education was printed on patient's after visit summary  Counseling:  Alcohol/drug use: discussed moderation in alcohol intake, the recommendations for healthy alcohol use, and avoidance of illicit drug use  Dental Health: discussed importance of regular tooth brushing, flossing, and dental visits  Injury prevention: discussed safety/seat belts, safety helmets, smoke detectors, carbon dioxide detectors, and smoking near bedding or upholstery  Sexual health: discussed sexually transmitted diseases, partner selection, use of condoms, avoidance of unintended pregnancy, and contraceptive alternatives  · Exercise: the importance of regular exercise/physical activity was discussed  Recommend exercise 3-5 times per week for at least 30 minutes  No follow-ups on file  Chief Complaint:     Chief Complaint   Patient presents with   • Annual Exam      History of Present Illness:     Adult Annual Physical   Patient here for a comprehensive physical exam  The patient reports no problems  Reports that her fingers will go pale while driving in the winter  No pain  Will notice swelling of her right hand fingertips-only 2 fingers occasionally not necessarily r/t cold exposure     Diet and Physical Activity  · Diet/Nutrition: well balanced diet  · Exercise: walking and strength training exercises  Depression Screening  PHQ-2/9 Depression Screening         General Health  · Sleep: sleeps well  · Hearing: normal - bilateral   · Vision: goes for regular eye exams and wears glasses  · Dental: regular dental visits  /GYN Health  · Patient is: perimenopausal  · Last menstrual period: 23  · Contraceptive method: barrier methods  Review of Systems:     Review of Systems   Constitutional: Negative  HENT: Negative  Negative for congestion, dental problem, ear pain, hearing loss, postnasal drip, rhinorrhea, sinus pressure, sinus pain, sore throat, tinnitus and trouble swallowing  Eyes: Negative  Respiratory: Negative  Cardiovascular: Negative  Gastrointestinal: Negative  Endocrine: Negative  Genitourinary: Negative  Musculoskeletal: Negative  Skin: Positive for color change and pallor  Neurological: Negative  Hematological: Negative  Psychiatric/Behavioral: Negative  Past Medical History:     History reviewed  No pertinent past medical history     Past Surgical History:     Past Surgical History:   Procedure Laterality Date   •  SECTION     • DILATION AND CURETTAGE OF UTERUS     • WISDOM TOOTH EXTRACTION        Social History:     Social History     Socioeconomic History   • Marital status: /Civil Union     Spouse name: None   • Number of children: None   • Years of education: None   • Highest education level: None   Occupational History   • Occupation:     Tobacco Use   • Smoking status: Former   • Smokeless tobacco: Never   Vaping Use   • Vaping Use: Never used   Substance and Sexual Activity   • Alcohol use: Yes     Comment: Socially   • Drug use: No   • Sexual activity: Yes     Partners: Male   Other Topics Concern   • None   Social History Narrative    Denied domestic violence in home enviornment     Social "Determinants of Health     Financial Resource Strain: Not on file   Food Insecurity: Not on file   Transportation Needs: Not on file   Physical Activity: Not on file   Stress: Not on file   Social Connections: Not on file   Intimate Partner Violence: Not on file   Housing Stability: Not on file      Family History:     Family History   Problem Relation Age of Onset   • Diabetes Mother    • Diabetes Brother    • Breast cancer Maternal Aunt    • Osteoporosis Maternal Aunt    • Alcohol abuse Father    • Bipolar disorder Sister       Current Medications:     Current Outpatient Medications   Medication Sig Dispense Refill   • Cholecalciferol (Vitamin D3) 125 MCG (5000 UT) TABS Take 5,000 Units by mouth daily     • EPINEPHrine (EPIPEN) 0 3 mg/0 3 mL SOAJ      • Evening Primrose Oil 1000 MG CAPS Take by mouth     • Multiple Vitamins-Minerals (multivitamin with minerals) tablet Take 1 tablet by mouth daily     • Omega-3 Fatty Acids (fish oil) 1,000 mg Take 1,000 mg by mouth daily     • Zinc Sulfate (ZINC 15 PO) Take by mouth     • Cetirizine HCl 10 MG CAPS  (Patient not taking: Reported on 1/27/2023)     • Cetirizine-Pseudoephedrine (ZYRTEC-D PO) Zyrtec   PRN (Patient not taking: Reported on 6/19/2023)       No current facility-administered medications for this visit  Allergies: Allergies   Allergen Reactions   • Latex Rash   • Other Allergic Rhinitis     Seasonal also eczema      Physical Exam:     /60 (BP Location: Left arm, Patient Position: Sitting, Cuff Size: Standard)   Pulse 62   Temp (!) 97 3 °F (36 3 °C) (Tympanic)   Ht 5' 6\" (1 676 m)   Wt 62 8 kg (138 lb 6 4 oz)   SpO2 99%   BMI 22 34 kg/m²     Physical Exam  Vitals reviewed  Constitutional:       Appearance: Normal appearance  She is normal weight  HENT:      Head: Normocephalic and atraumatic        Right Ear: Tympanic membrane, ear canal and external ear normal       Left Ear: Tympanic membrane, ear canal and external ear normal       " Nose: Nose normal       Mouth/Throat:      Mouth: Mucous membranes are moist       Pharynx: Oropharynx is clear  Eyes:      Conjunctiva/sclera: Conjunctivae normal       Pupils: Pupils are equal, round, and reactive to light  Neck:      Thyroid: No thyromegaly  Cardiovascular:      Rate and Rhythm: Normal rate and regular rhythm  Heart sounds: Normal heart sounds  No murmur heard  Pulmonary:      Effort: Pulmonary effort is normal       Breath sounds: Normal breath sounds  Abdominal:      General: Abdomen is flat  Bowel sounds are normal       Palpations: Abdomen is soft  There is no hepatomegaly or splenomegaly  Tenderness: There is no abdominal tenderness  Musculoskeletal:         General: Normal range of motion  Cervical back: Normal range of motion and neck supple  Skin:     General: Skin is warm and dry  Capillary Refill: Capillary refill takes less than 2 seconds  Neurological:      General: No focal deficit present  Mental Status: She is alert and oriented to person, place, and time  Psychiatric:         Mood and Affect: Mood normal          Behavior: Behavior normal          Thought Content:  Thought content normal          Judgment: Judgment normal           Berta Ambreen, [address] [address]

## 2023-06-20 LAB — 25(OH)D3 SERPL-MCNC: 42.9 NG/ML (ref 30–100)

## 2023-06-22 ENCOUNTER — APPOINTMENT (OUTPATIENT)
Dept: PHYSICAL THERAPY | Facility: REHABILITATION | Age: 53
End: 2023-06-22
Payer: COMMERCIAL

## 2023-07-06 ENCOUNTER — TELEPHONE (OUTPATIENT)
Dept: PSYCHIATRY | Facility: CLINIC | Age: 53
End: 2023-07-06

## 2023-07-06 NOTE — TELEPHONE ENCOUNTER
Writer ALPHONSE to schedule f/u appointments as patient does not have any scheduled. Please schedule upon return call.  Thank you

## 2023-09-19 ENCOUNTER — DOCUMENTATION (OUTPATIENT)
Dept: BEHAVIORAL/MENTAL HEALTH CLINIC | Facility: CLINIC | Age: 53
End: 2023-09-19

## 2023-09-19 NOTE — PROGRESS NOTES
Psychotherapy Discharge Summary    Preferred Name: Vasile Osman  YOB: 1970    Admission date to psychotherapy: 05/21/2020  Referred by: self    Presenting Problem: wanted to build self confidence, self esteem, talk about things she admits she has suppressed, possible marital stress, and an adult addicted son    Course of treatment included : individual therapy     Progress/Outcome of Treatment Goals (brief summary of course of treatment) patient made strides in all areas listed    Treatment Complications (if any):n/a    Treatment Progress: good    Current [facility] Provider: n/a PCP    Discharge Medications include: as per PCP    Discharge Date: 07/10/2023    Discharge Diagnosis: Adjustment disorder with mixed emotional features    Criteria for Discharge: completed treatment goals and objectives and is no longer in need of services    Aftercare recommendations include (include specific referral names and phone numbers, if appropriate): n/a    Prognosis: good

## 2023-09-21 ENCOUNTER — TELEPHONE (OUTPATIENT)
Dept: PSYCHIATRY | Facility: CLINIC | Age: 53
End: 2023-09-21

## 2023-09-21 NOTE — TELEPHONE ENCOUNTER
DISCHARGE LETTER for Jean-Paul Gay LCSW, ACSW, BCBAMBI, [facility] (certified and regular) placed in outgoing mail on 09/21/23.     Article #:  [id number]    Address:  [address] [place] [id number]

## 2024-09-19 ENCOUNTER — TELEPHONE (OUTPATIENT)
Dept: FAMILY MEDICINE CLINIC | Facility: HOSPITAL | Age: 54
End: 2024-09-19

## 2024-09-19 NOTE — TELEPHONE ENCOUNTER
----- Message from JULIETA Casey sent at 9/18/2024 12:04 PM EDT -----  Pt is over due for HM. Please call to schedule.

## 2024-10-03 ENCOUNTER — TELEPHONE (OUTPATIENT)
Dept: FAMILY MEDICINE CLINIC | Facility: HOSPITAL | Age: 54
End: 2024-10-03

## 2024-10-07 ENCOUNTER — RA CDI HCC (OUTPATIENT)
Dept: OTHER | Facility: HOSPITAL | Age: 54
End: 2024-10-07

## 2024-10-15 ENCOUNTER — OFFICE VISIT (OUTPATIENT)
Dept: FAMILY MEDICINE CLINIC | Facility: HOSPITAL | Age: 54
End: 2024-10-15
Payer: COMMERCIAL

## 2024-10-15 ENCOUNTER — APPOINTMENT (OUTPATIENT)
Dept: LAB | Facility: HOSPITAL | Age: 54
End: 2024-10-15
Payer: COMMERCIAL

## 2024-10-15 VITALS
TEMPERATURE: 97.3 F | WEIGHT: 148 LBS | DIASTOLIC BLOOD PRESSURE: 74 MMHG | HEIGHT: 66 IN | HEART RATE: 66 BPM | SYSTOLIC BLOOD PRESSURE: 128 MMHG | BODY MASS INDEX: 23.78 KG/M2

## 2024-10-15 DIAGNOSIS — E78.00 ELEVATED LDL CHOLESTEROL LEVEL: ICD-10-CM

## 2024-10-15 DIAGNOSIS — Z12.31 ENCOUNTER FOR SCREENING MAMMOGRAM FOR BREAST CANCER: ICD-10-CM

## 2024-10-15 DIAGNOSIS — Z13.1 SCREENING FOR DIABETES MELLITUS: ICD-10-CM

## 2024-10-15 DIAGNOSIS — E55.9 VITAMIN D DEFICIENCY: ICD-10-CM

## 2024-10-15 DIAGNOSIS — Z00.00 ANNUAL PHYSICAL EXAM: Primary | ICD-10-CM

## 2024-10-15 LAB
25(OH)D3 SERPL-MCNC: 44.6 NG/ML (ref 30–100)
ALBUMIN SERPL BCG-MCNC: 4.3 G/DL (ref 3.5–5)
ALP SERPL-CCNC: 37 U/L (ref 34–104)
ALT SERPL W P-5'-P-CCNC: 24 U/L (ref 7–52)
ANION GAP SERPL CALCULATED.3IONS-SCNC: 8 MMOL/L (ref 4–13)
AST SERPL W P-5'-P-CCNC: 28 U/L (ref 13–39)
BASOPHILS # BLD AUTO: 0.04 THOUSANDS/ΜL (ref 0–0.1)
BASOPHILS NFR BLD AUTO: 1 % (ref 0–1)
BILIRUB SERPL-MCNC: 0.63 MG/DL (ref 0.2–1)
BUN SERPL-MCNC: 8 MG/DL (ref 5–25)
CALCIUM SERPL-MCNC: 9.1 MG/DL (ref 8.4–10.2)
CHLORIDE SERPL-SCNC: 101 MMOL/L (ref 96–108)
CHOLEST SERPL-MCNC: 241 MG/DL
CO2 SERPL-SCNC: 31 MMOL/L (ref 21–32)
CREAT SERPL-MCNC: 0.69 MG/DL (ref 0.6–1.3)
EOSINOPHIL # BLD AUTO: 0.11 THOUSAND/ΜL (ref 0–0.61)
EOSINOPHIL NFR BLD AUTO: 2 % (ref 0–6)
ERYTHROCYTE [DISTWIDTH] IN BLOOD BY AUTOMATED COUNT: 12.1 % (ref 11.6–15.1)
EST. AVERAGE GLUCOSE BLD GHB EST-MCNC: 105 MG/DL
GFR SERPL CREATININE-BSD FRML MDRD: 99 ML/MIN/1.73SQ M
GLUCOSE P FAST SERPL-MCNC: 94 MG/DL (ref 65–99)
HBA1C MFR BLD: 5.3 %
HCT VFR BLD AUTO: 42.7 % (ref 34.8–46.1)
HDLC SERPL-MCNC: 80 MG/DL
HGB BLD-MCNC: 14.1 G/DL (ref 11.5–15.4)
IMM GRANULOCYTES # BLD AUTO: 0 THOUSAND/UL (ref 0–0.2)
IMM GRANULOCYTES NFR BLD AUTO: 0 % (ref 0–2)
LDLC SERPL CALC-MCNC: 149 MG/DL (ref 0–100)
LYMPHOCYTES # BLD AUTO: 1.85 THOUSANDS/ΜL (ref 0.6–4.47)
LYMPHOCYTES NFR BLD AUTO: 41 % (ref 14–44)
MCH RBC QN AUTO: 30.9 PG (ref 26.8–34.3)
MCHC RBC AUTO-ENTMCNC: 33 G/DL (ref 31.4–37.4)
MCV RBC AUTO: 93 FL (ref 82–98)
MONOCYTES # BLD AUTO: 0.45 THOUSAND/ΜL (ref 0.17–1.22)
MONOCYTES NFR BLD AUTO: 10 % (ref 4–12)
NEUTROPHILS # BLD AUTO: 2.11 THOUSANDS/ΜL (ref 1.85–7.62)
NEUTS SEG NFR BLD AUTO: 46 % (ref 43–75)
NONHDLC SERPL-MCNC: 161 MG/DL
NRBC BLD AUTO-RTO: 0 /100 WBCS
PLATELET # BLD AUTO: 195 THOUSANDS/UL (ref 149–390)
PMV BLD AUTO: 11.3 FL (ref 8.9–12.7)
POTASSIUM SERPL-SCNC: 3.9 MMOL/L (ref 3.5–5.3)
PROT SERPL-MCNC: 7.2 G/DL (ref 6.4–8.4)
RBC # BLD AUTO: 4.57 MILLION/UL (ref 3.81–5.12)
SODIUM SERPL-SCNC: 140 MMOL/L (ref 135–147)
TRIGL SERPL-MCNC: 60 MG/DL
WBC # BLD AUTO: 4.56 THOUSAND/UL (ref 4.31–10.16)

## 2024-10-15 PROCEDURE — 36415 COLL VENOUS BLD VENIPUNCTURE: CPT

## 2024-10-15 PROCEDURE — 83036 HEMOGLOBIN GLYCOSYLATED A1C: CPT

## 2024-10-15 PROCEDURE — 80061 LIPID PANEL: CPT

## 2024-10-15 PROCEDURE — 99396 PREV VISIT EST AGE 40-64: CPT | Performed by: NURSE PRACTITIONER

## 2024-10-15 PROCEDURE — 82306 VITAMIN D 25 HYDROXY: CPT

## 2024-10-15 PROCEDURE — 85025 COMPLETE CBC W/AUTO DIFF WBC: CPT

## 2024-10-15 PROCEDURE — 80053 COMPREHEN METABOLIC PANEL: CPT

## 2024-10-15 RX ORDER — PREDNISONE 10 MG/1
TABLET ORAL
COMMUNITY
Start: 2024-10-09

## 2024-10-15 RX ORDER — CHLORHEXIDINE GLUCONATE ORAL RINSE 1.2 MG/ML
SOLUTION DENTAL
COMMUNITY
Start: 2024-08-26

## 2024-10-15 RX ORDER — AMOXICILLIN 875 MG/1
TABLET, COATED ORAL
COMMUNITY
Start: 2024-10-09

## 2024-10-15 NOTE — PATIENT INSTRUCTIONS
"Patient Education     Routine physical for adults   The Basics   Written by the doctors and editors at [facility]   What is a physical? -- A physical is a routine visit, or \"check-up,\" with your doctor. You might also hear it called a \"wellness visit\" or \"preventive visit.\"  During each visit, the doctor will:   Ask about your physical and mental health   Ask about your habits, behaviors, and lifestyle   Do an exam   Give you vaccines if needed   Talk to you about any medicines you take   Give advice about your health   Answer your questions  Getting regular check-ups is an important part of taking care of your health. It can help your doctor find and treat any problems you have. But it's also important for preventing health problems.  A routine physical is different from a \"sick visit.\" A sick visit is when you see a doctor because of a health concern or problem. Since physicals are scheduled ahead of time, you can think about what you want to ask the doctor.  How often should I get a physical? -- It depends on your age and health. In general, for people age 21 years and older:   If you are younger than 50 years, you might be able to get a physical every 3 years.   If you are 50 years or older, your doctor might recommend a physical every year.  If you have an ongoing health condition, like diabetes or high blood pressure, your doctor will probably want to see you more often.  What happens during a physical? -- In general, each visit will include:   Physical exam - The doctor or nurse will check your height, weight, heart rate, and blood pressure. They will also look at your eyes and ears. They will ask about how you are feeling and whether you have any symptoms that bother you.   Medicines - It's a good idea to bring a list of all the medicines you take to each doctor visit. Your doctor will talk to you about your medicines and answer any questions. Tell them if you are having any side effects that bother you. You " "should also tell them if you are having trouble paying for any of your medicines.   Habits and behaviors - This includes:   Your diet   Your exercise habits   Whether you smoke, drink alcohol, or use drugs   Whether you are sexually active   Whether you feel safe at home  Your doctor will talk to you about things you can do to improve your health and lower your risk of health problems. They will also offer help and support. For example, if you want to quit smoking, they can give you advice and might prescribe medicines. If you want to improve your diet or get more physical activity, they can help you with this, too.   Lab tests, if needed - The tests you get will depend on your age and situation. For example, your doctor might want to check your:   Cholesterol   Blood sugar   Iron level   Vaccines - The recommended vaccines will depend on your age, health, and what vaccines you already had. Vaccines are very important because they can prevent certain serious or deadly infections.   Discussion of screening - \"Screening\" means checking for diseases or other health problems before they cause symptoms. Your doctor can recommend screening based on your age, risk, and preferences. This might include tests to check for:   Cancer, such as breast, prostate, cervical, ovarian, colorectal, prostate, lung, or skin cancer   Sexually transmitted infections, such as chlamydia and gonorrhea   Mental health conditions like depression and anxiety  Your doctor will talk to you about the different types of screening tests. They can help you decide which screenings to have. They can also explain what the results might mean.   Answering questions - The physical is a good time to ask the doctor or nurse questions about your health. If needed, they can refer you to other doctors or specialists, too.  Adults older than 65 years often need other care, too. As you get older, your doctor will talk to you about:   How to prevent falling at " home   Hearing or vision tests   Memory testing   How to take your medicines safely   Making sure that you have the help and support you need at home  All topics are updated as new evidence becomes available and our peer review process is complete.  This topic retrieved from [employer] on: May 02, 2024.  Topic 956387 Version 1.0  Release: 32.4.3 - C32.122  © 2024 UpToDate, Inc. and/or its affiliates. All rights reserved.  Consumer Information Use and Disclaimer   Disclaimer: This generalized information is a limited summary of diagnosis, treatment, and/or medication information. It is not meant to be comprehensive and should be used as a tool to help the user understand and/or assess potential diagnostic and treatment options. It does NOT include all information about conditions, treatments, medications, side effects, or risks that may apply to a specific patient. It is not intended to be medical advice or a substitute for the medical advice, diagnosis, or treatment of a health care provider based on the health care provider's examination and assessment of a patient's specific and unique circumstances. Patients must speak with a health care provider for complete information about their health, medical questions, and treatment options, including any risks or benefits regarding use of medications. This information does not endorse any treatments or medications as safe, effective, or approved for treating a specific patient. UpToDate, Inc. and its affiliates disclaim any warranty or liability relating to this information or the use thereof.The use of this information is governed by the Terms of Use, available at https://www.woltersPiano Mediauwer.com/en/know/clinical-effectiveness-terms. 2024© UpToDate, Inc. and its affiliates and/or licensors. All rights reserved.  Copyright   © 2024 UpToDate, Inc. and/or its affiliates. All rights reserved.    Patient Education     Mediterranean diet   The Basics   Written by the doctors and  editors at [employer]   What is a Mediterranean diet? -- A Mediterranean diet is a heart-healthy way of eating. It includes foods and cooking styles from many countries around the [place] Sea, like [place] and [place]. The exact foods included vary from place to place.  A Mediterranean diet involves eating a lot of fruits, vegetables, nuts, and whole grains. It uses olive oil instead of other fats. It also includes some fish, poultry, and dairy products, but not a lot of red meat.  Wine is often thought of as part of a Mediterranean diet. It is not needed, and you might choose not to include it. If you do drink alcohol, limit the amount to:   For females, no more than 1 drink a day   For males, no more than 2 drinks a day  What are the benefits of a Mediterranean diet? -- A Mediterranean diet can help you:   Improve your overall health, and help you lose weight   Lower your risk of stroke   Lower your risk of heart problems such as a heart attack   Manage your blood sugar if you have diabetes  What can I eat and drink on a Mediterranean diet? -- A Mediterranean diet is more of an eating pattern than a strict diet. Try to cover two-thirds of your plate with fresh fruits and vegetables. Some examples of foods that are often part of this pattern:   Grains - Whole grains like whole-grain bread and pasta, oats, couscous, brown rice, barley, and orzo.   Fruits - Many kinds and colors of fresh, frozen, dried, or canned fruits. Frozen or canned fruits with 100% fruit juice or water (without added sugar). Examples include apples, pears, berries, melons, bananas, plums, raisins, figs, and peaches.   Vegetables - Many kinds and colors of fresh, frozen, or canned vegetables. If canned, low sodium or salt free. If frozen, without added fat and sodium. Examples include avocados, peppers, tomatoes, spinach, kale, beans, carrots, peas, olives, cucumbers, hummus, soybeans, lentils, and kidney beans.   Dairy - Low-fat milk, cheese,  and other dairy products. Greek yogurt, kefir, and plant-based milk alternatives like soy milk.   Lean meats, poultry, seafood, and proteins - [place], tuna, cod, and other fish. Shrimp, clams, scallops, and mussels. White meat chicken and turkey, eggs, dried beans, lentils, and tofu. Nuts such as walnuts, almonds, pecans, hazelnuts, cashews, peanuts, and nut butters. Seeds such as pumpkin, sesame, flax, and sunflower seeds.   Fats, oils, and other foods - Foods with healthy fats found in fish, nuts, and avocados. Olive oil or vegetable oils such as canola oil. Use onions, garlic, spices, and herbs to season food.  What foods and drinks should I avoid or limit on a Mediterranean diet? -- A Mediterranean diet involves avoiding or limiting certain types of foods. Try to avoid foods with additives like artificial sweeteners. Avoid foods that are processed, refined, or preserved. These are often foods with a very long shelf life.   Grains to avoid - White bread, pasta, white rice, crackers, and biscuits.   Fruits to avoid - Fruits canned or frozen with extra sugar.   Vegetables to avoid - Commercially prepared potatoes and vegetable mixes, regular canned vegetables and juices, and vegetables frozen with sauce or pickled vegetables.   Dairy to avoid - Whole-fat dairy products like cheese, ice cream, whole milk, cream, and buttermilk.   Lean meats, poultry, seafood, and proteins to avoid - Red meat such as beef, pork, and lamb. Processed meats such as sausages, deli meats, salami, hot dogs, and coreas.   Fats, oils, and other foods to avoid - Butter, margarine, lard, gravies, sauces, and salad dressing. Cookies, cakes, candy, doughnuts, muffins, and other sweets.  All topics are updated as new evidence becomes available and our peer review process is complete.  This topic retrieved from [employer] on: Apr 04, 2024.  Topic 687202 Version 2.0  Release: 32.2.4 - C32.93  © 2024 UpToDate, Inc. and/or its affiliates. All rights  reserved.  Consumer Information Use and Disclaimer   Disclaimer: This generalized information is a limited summary of diagnosis, treatment, and/or medication information. It is not meant to be comprehensive and should be used as a tool to help the user understand and/or assess potential diagnostic and treatment options. It does NOT include all information about conditions, treatments, medications, side effects, or risks that may apply to a specific patient. It is not intended to be medical advice or a substitute for the medical advice, diagnosis, or treatment of a health care provider based on the health care provider's examination and assessment of a patient's specific and unique circumstances. Patients must speak with a health care provider for complete information about their health, medical questions, and treatment options, including any risks or benefits regarding use of medications. This information does not endorse any treatments or medications as safe, effective, or approved for treating a specific patient. UpToDate, Inc. and its affiliates disclaim any warranty or liability relating to this information or the use thereof.The use of this information is governed by the Terms of Use, available at https://www.woltersMedicaMetrixuwer.com/en/know/clinical-effectiveness-terms. 2024© UpToDate, Inc. and its affiliates and/or licensors. All rights reserved.  Copyright   © 2024 UpToDate, Inc. and/or its affiliates. All rights reserved.

## 2024-10-15 NOTE — PROGRESS NOTES
Adult Annual Physical  Name: Lorrie Kauffman      : 1970      MRN: [id number]  Encounter Provider: JULIETA Casey  Encounter Date: 10/15/2024   Encounter department: [facility] [address]     Assessment & Plan  Annual physical exam         Elevated LDL cholesterol level    Orders:    CBC and differential; Future    Comprehensive metabolic panel; Future    Lipid panel; Future    Vitamin D deficiency    Orders:    Vitamin D 25 hydroxy; Future    Encounter for screening mammogram for breast cancer    Orders:    Mammo screening bilateral w 3d and cad; Future    Screening for diabetes mellitus    Orders:    Hemoglobin A1C; Future      Immunizations and preventive care screenings were discussed with patient today. Appropriate education was printed on patient's after visit summary.    Counseling:  Alcohol/drug use: discussed moderation in alcohol intake, the recommendations for healthy alcohol use, and avoidance of illicit drug use.  Dental Health: discussed importance of regular tooth brushing, flossing, and dental visits.  Injury prevention: discussed safety/seat belts, safety helmets, smoke detectors, carbon monoxide detectors, and smoking near bedding or upholstery.  Sexual health: discussed sexually transmitted diseases, partner selection, use of condoms, avoidance of unintended pregnancy, and contraceptive alternatives.  Exercise: the importance of regular exercise/physical activity was discussed. Recommend exercise 3-5 times per week for at least 30 minutes.       Depression Screening and Follow-up Plan: Patient was screened for depression during today's encounter. They screened negative with a PHQ-9 score of 0.        History of Present Illness     Adult Annual Physical:  Patient presents for annual physical.     Diet and Physical Activity:  - Diet/Nutrition: well balanced diet.  - Exercise: walking and 5-7 times a week on average. yoga and pilates    Depression Screening:    -  "PHQ-9 Score: 0    General Health:  - Sleep: sleeps well.  - Hearing: normal hearing bilateral ears.  - Vision: wears glasses and goes for regular eye exams.  - Dental: regular dental visits.    /GYN Health:  - Follows with GYN: yes.   - Menopause: perimenopausal.   - Last menstrual cycle: 9/22/2024.     Advanced Care Planning:  - Has an advanced directive?: no    - Has a durable medical POA?: no    - ACP document given to patient?: yes      Review of Systems   Constitutional: Negative.    HENT: Negative.     Eyes: Negative.    Respiratory: Negative.     Cardiovascular: Negative.    Gastrointestinal: Negative.    Endocrine: Negative.    Genitourinary: Negative.    Musculoskeletal: Negative.    Skin: Negative.    Neurological: Negative.    Hematological: Negative.    Psychiatric/Behavioral: Negative.           Objective     /74 (BP Location: Left arm, Patient Position: Sitting, Cuff Size: Standard)   Pulse 66   Temp (!) 97.3 °F (36.3 °C) (Tympanic)   Ht 5' 6\" (1.676 m)   Wt 67.1 kg (148 lb)   BMI 23.89 kg/m²     Physical Exam  Vitals reviewed.   Constitutional:       Appearance: Normal appearance. She is normal weight.   HENT:      Head: Normocephalic and atraumatic.      Right Ear: Tympanic membrane, ear canal and external ear normal.      Left Ear: Tympanic membrane, ear canal and external ear normal.      Nose: Nose normal.      Mouth/Throat:      Mouth: Mucous membranes are moist.      Pharynx: Oropharynx is clear.   Eyes:      Conjunctiva/sclera: Conjunctivae normal.      Pupils: Pupils are equal, round, and reactive to light.   Neck:      Thyroid: No thyromegaly.   Cardiovascular:      Rate and Rhythm: Normal rate and regular rhythm.      Heart sounds: Normal heart sounds. No murmur heard.  Pulmonary:      Effort: Pulmonary effort is normal.      Breath sounds: Normal breath sounds.   Abdominal:      General: Abdomen is flat. Bowel sounds are normal.      Palpations: Abdomen is soft. There is no " hepatomegaly or splenomegaly.      Tenderness: There is no abdominal tenderness.   Musculoskeletal:         General: Normal range of motion.      Cervical back: Normal range of motion and neck supple.   Skin:     General: Skin is warm and dry.      Capillary Refill: Capillary refill takes less than 2 seconds.   Neurological:      General: No focal deficit present.      Mental Status: She is alert and oriented to person, place, and time.   Psychiatric:         Mood and Affect: Mood normal.         Behavior: Behavior normal.         Thought Content: Thought content normal.         Judgment: Judgment normal.

## 2024-12-04 ENCOUNTER — NURSE TRIAGE (OUTPATIENT)
Age: 54
End: 2024-12-04

## 2024-12-04 NOTE — TELEPHONE ENCOUNTER
"Patient calling in stating that she's having intermittent spotting for the last 2.5-3 weeks. Pts spotting is accumulating on a pad that varies ex size of a dime to \"more then that\". Pt stating the bleeding increases with sexual intercourse. Pt stating that she had cramping earlier today but not at the moment. Pt stating highly unlikely. Pt is advised to take a pregnancy test and if positive to call back. Pt denies passed tissue, vaginal discharge, fever, menstrual-type cramps    Patient is advised to be seen within 2 weeks as per protocol, pt Is scheduled for 12/11/24, pt is notified of bleeding precautions of:   Please go to the nearest emergency room or call 911 if you have heavy bleeding saturating more then one pad an hour, large clots the size of a golf ball or plum, if you have excruciating abdominal pain, Pt verbalized understanding, no further questions at this time        Reason for Disposition   Periods last > 7 days    Answer Assessment - Initial Assessment Questions  1. BLEEDING SEVERITY: \"Describe the bleeding that you are having.\" \"How much bleeding is there?\"       Pts spotting is accumulating on a pad that varies ex size of a dime to \"more then that\". Pt stating the bleeding increases with sexual intercourse.   2. ONSET: \"When did the bleeding begin?\" \"Is it continuing now?\"      2.5-3 weeks ago.   3. MENSTRUAL PERIOD: \"When was the last normal menstrual period?\" \"How is this different than your period?\"      LMP beginning of November   4. REGULARITY: \"How regular are your periods?\"      Irregular   5. ABDOMEN PAIN: \"Do you have any pain?\" \"How bad is the pain?\"  (e.g., Scale 0-10; none, mild, moderate, or severe)      Pt denies   6. PREGNANCY: \"Is there any chance you are pregnant?\" \"When was your last menstrual period?\"      Pt stating highly unlikely. Pt is advised to take a pregnancy test and if positive to call back     8. HORMONE MEDICINES: \"Are you taking any hormone medicines, prescription or " "over-the-counter?\" (e.g., birth control pills, estrogen)      Pt denies   9. BLOOD THINNER MEDICINES: \"Do you take any blood thinners?\" (e.g., Coumadin / warfarin, Pradaxa / dabigatran, aspirin)      Pt denies   10. CAUSE: \"What do you think is causing the bleeding?\" (e.g., recent gyn surgery, recent gyn procedure; known bleeding disorder, cervical cancer, polycystic ovarian disease, fibroids)          Pt denies   11. HEMODYNAMIC STATUS: \"Are you weak or feeling lightheaded?\" If Yes, ask: \"Can you stand and walk normally?\"         Pt deneis   12. OTHER SYMPTOMS: \"What other symptoms are you having with the bleeding?\" (e.g., passed tissue, vaginal discharge, fever, menstrual-type cramps)        Pt denies passed tissue, vaginal discharge, fever, menstrual-type cramps    Protocols used: Vaginal Bleeding - Abnormal-Adult-[place]    "

## 2024-12-11 ENCOUNTER — OFFICE VISIT (OUTPATIENT)
Dept: OBGYN CLINIC | Facility: CLINIC | Age: 54
End: 2024-12-11
Payer: COMMERCIAL

## 2024-12-11 VITALS
WEIGHT: 144 LBS | SYSTOLIC BLOOD PRESSURE: 116 MMHG | BODY MASS INDEX: 23.14 KG/M2 | DIASTOLIC BLOOD PRESSURE: 80 MMHG | HEIGHT: 66 IN

## 2024-12-11 DIAGNOSIS — N92.4 ABNORMAL PERIMENOPAUSAL BLEEDING: Primary | ICD-10-CM

## 2024-12-11 PROCEDURE — 99214 OFFICE O/P EST MOD 30 MIN: CPT | Performed by: OBSTETRICS & GYNECOLOGY

## 2024-12-11 NOTE — PROGRESS NOTES
Assessment/Plan:      Diagnoses and all orders for this visit:    Abnormal perimenopausal bleeding          Subjective:     Patient ID: Lorrie Kauffman is a 54 y.o. female.    Patient is a 54-year-old female who presents today for evaluation of intermittent episodes of vaginal bleeding over the past 4 to 6 weeks.    Patient reports that over the past year she has had intermittent menstrual cycles.  She can go anywhere from 30-40 5 to 60 days in between.  The flow is generally light and not accompanied by any heavy bleeding or clotting.  She also denies any significant pelvic or abdominal pain as well.    Patient denies any significant vaginal discharge    She denies any urinary symptoms including frequency, dysuria or hematuria.  She denies any fever shakes or chills as well.    Patient had a pelvic ultrasound approximately 18 months ago which was essentially unremarkable for significant findings.    At this time we will order a follow-up pelvic ultrasound for evaluation of the pelvis and particularly the status of the endometrial stripe.    We discussed various outcomes that may be seen on the ultrasound    Depending on what the findings are, will help us determine what an appropriate treatment and follow-up plan will be for her    All questions were answered in detail during today's visit    Pelvic ultrasound was scheduled    Patient was told to feel free to call for any problems, questions, issues or concerns which may arise for her.      Total time of today's visit was 25 minutes of which greater than 50% was spent face-to-face counseling the patient as well as coordination of care, review of chart and lab values, physical examination as well as computer entry into the [facility] medical record system.          Review of Systems   Constitutional: Negative.  Negative for appetite change, diaphoresis, fatigue, fever and unexpected weight change.   HENT: Negative.     Eyes: Negative.    Respiratory: Negative.      Cardiovascular: Negative.    Gastrointestinal: Negative.  Negative for abdominal pain, blood in stool, constipation, diarrhea, nausea and vomiting.   Endocrine: Negative.  Negative for cold intolerance and heat intolerance.   Genitourinary: Negative.  Negative for dysuria, frequency, hematuria, urgency, vaginal bleeding, vaginal discharge and vaginal pain.   Musculoskeletal: Negative.    Skin: Negative.    Allergic/Immunologic: Negative.    Neurological: Negative.    Hematological: Negative.  Negative for adenopathy.   Psychiatric/Behavioral: Negative.           Objective:     Physical Exam  Constitutional:       Appearance: She is well-developed.   HENT:      Head: Normocephalic.   Eyes:      Pupils: Pupils are equal, round, and reactive to light.   Cardiovascular:      Rate and Rhythm: Normal rate.   Pulmonary:      Effort: Pulmonary effort is normal.   Abdominal:      Palpations: Abdomen is soft.   Genitourinary:     Labia:         Right: No rash, tenderness, lesion or injury.         Left: No rash, tenderness, lesion or injury.       Urethra: No urethral swelling or urethral lesion.      Vagina: No vaginal discharge, erythema, bleeding or lesions.      Cervix: Normal. No discharge, lesion or erythema.      Uterus: Normal. Not enlarged and not tender.       Adnexa: Right adnexa normal and left adnexa normal.        Right: No mass, tenderness or fullness.          Left: No mass, tenderness or fullness.        Comments: Pelvic exam was essentially unremarkable  Will order pelvic ultrasound for evaluation of the endometrial stripe  Musculoskeletal:         General: Normal range of motion.      Cervical back: Normal range of motion and neck supple.   Skin:     General: Skin is warm and dry.   Neurological:      General: No focal deficit present.      Mental Status: She is alert and oriented to person, place, and time.   Psychiatric:         Mood and Affect: Mood normal.         Behavior: Behavior normal.          Thought Content: Thought content normal.         Judgment: Judgment normal.

## 2024-12-16 NOTE — PROGRESS NOTES
AMB US Pelvic Non OB    Date/Time: 12/17/2024 9:00 AM    Performed by: Anne-Marie Moise  Authorized by: Jose Daniel Dubose MD  Universal Protocol:  Consent: Verbal consent obtained.  Consent given by: patient  Timeout called at: 12/17/2024 9:03 AM.  Patient understanding: patient states understanding of the procedure being performed  Patient identity confirmed: verbally with patient    Procedure details:     SIS Procedure: No    Indications: non-obstetric vaginal bleeding      Technique:  Transvaginal US, Non-OB    Position: lithotomy exam    Uterine findings:     Length (cm): 9.1    Height (cm):  4.71    Width (cm):  5.95    Endometrial stripe: identified      Endometrium thickness (mm):  6.88  Left ovary findings:     Left ovary:  Visualized    Length (cm): 3.09    Height (cm): 1.71    Width (cm): 1.9  Right ovary findings:     Right ovary:  Visualized    Length (cm): 3.43    Height (cm): 1.54    Width (cm): 1.66  Other findings:     Free pelvic fluid: not identified      Free peritoneal fluid: not identified    Post-Procedure Details:     Impression:  Anteverted uterus is inhomogeneous throughout without fibroids suggestive of adenomyosis. The bilateral ovaries appear within normal limits.     Tolerance:  Tolerated well, no immediate complications    Complications: no complications    Additional Procedure Comments:      SpecifiedBy VolusonP8 RIC5-9A-[id number] transvaginal transducer Serial #[id number] was used to perform the examination today and subsequently followed with high level disinfection utilizing Trophon EPR procedure.     Ultrasound performed at:     Counts include 234 beds at the [facility]  [address] [facility]  [address] [place] [address]  Phone  [phone]  Fax  [phone]

## 2024-12-17 ENCOUNTER — ULTRASOUND (OUTPATIENT)
Dept: OBGYN CLINIC | Facility: CLINIC | Age: 54
End: 2024-12-17

## 2024-12-17 DIAGNOSIS — N93.9 ABNORMAL UTERINE BLEEDING (AUB): Primary | ICD-10-CM

## 2024-12-18 ENCOUNTER — OFFICE VISIT (OUTPATIENT)
Dept: OBGYN CLINIC | Facility: CLINIC | Age: 54
End: 2024-12-18
Payer: COMMERCIAL

## 2024-12-18 VITALS — DIASTOLIC BLOOD PRESSURE: 80 MMHG | BODY MASS INDEX: 23.34 KG/M2 | SYSTOLIC BLOOD PRESSURE: 140 MMHG | WEIGHT: 144.6 LBS

## 2024-12-18 DIAGNOSIS — Z71.9 ENCOUNTER FOR CONSULTATION: ICD-10-CM

## 2024-12-18 DIAGNOSIS — N92.4 ABNORMAL PERIMENOPAUSAL BLEEDING: Primary | ICD-10-CM

## 2024-12-18 PROCEDURE — 99213 OFFICE O/P EST LOW 20 MIN: CPT | Performed by: OBSTETRICS & GYNECOLOGY

## 2024-12-18 NOTE — PATIENT INSTRUCTIONS
Topic: Perimenopausal bleeding    I reviewed the results of the pelvic ultrasound from yesterday including the endometrial stripe and normal findings regarding her ovaries.    In light of the intermittent episodes of bleeding, I advised her that we will see her in the near future and determine whether the condition persists.  If so we will consider diagnostic hysteroscopy and D&C to try and determine whether there may be a polyp present or whether of the curettage will and the intermittent spotting.    We also discussed options including ablation as well as hysterectomy.    All questions were answered in detail for her during today's visit    She was told to call for any problems, questions, issues or concerns which may arise for her.

## 2024-12-18 NOTE — PROGRESS NOTES
Assessment/Plan:      Diagnoses and all orders for this visit:    Abnormal perimenopausal bleeding    Encounter for consultation          Subjective:     Patient ID: Lorrie Kauffman is a 54 y.o. female.    Patient is a 54-year-old female who presents today for a discussion of her ultrasound results from yesterday and to develop a treatment plan for her perimenopausal bleeding pattern.    Over the past several weeks as noted previously, patient has been experiencing intermittent episodes of vaginal bleeding.  She denies any heavy clots or flows with these episodes however.    She also denies any significant pelvic or abdominal pain as well.    She had a prior pelvic ultrasound in January 2023 with an endometrial stripe of 4 mm.    The pelvic scan from yesterday showed a stripe of 6.88 mm however during that entire timeframe she was still having intermittent but menstrual cycles nonetheless.    Ovaries were noted to be normal in size shape and appearance    I discussed with the patient what our next step in treatment will be.  I will see her in approximately 6 to 8 weeks for clinical follow-up and if she is still experiencing the intermittent episodes of bleeding we will refer her for diagnostic hysteroscopy and sharp D&C.  The value of doing these 2 procedures was explained in detail to her during today's visit.    She is comfortable with this approach and an appointment to be seen in the near future was made today.    All questions were answered in detail for her during today's visit    I advised her to always feel free to call for any problems, questions, issues or concerns which may arise for her      Total time of today's visit was 20 minutes of which greater than 50% was spent face-to-face counseling the patient as well as coordination of care, review of chart and lab values, physical examination as well as computer entry into the [facility] medical record system.          Review of Systems   Constitutional: Negative.   Negative for appetite change, diaphoresis, fatigue, fever and unexpected weight change.   HENT: Negative.     Eyes: Negative.    Respiratory: Negative.     Cardiovascular: Negative.    Gastrointestinal: Negative.  Negative for abdominal pain, blood in stool, constipation, diarrhea, nausea and vomiting.   Endocrine: Negative.  Negative for cold intolerance and heat intolerance.   Genitourinary: Negative.  Negative for dysuria, frequency, hematuria, urgency, vaginal bleeding, vaginal discharge and vaginal pain.   Musculoskeletal: Negative.    Skin: Negative.    Allergic/Immunologic: Negative.    Neurological: Negative.    Hematological: Negative.  Negative for adenopathy.   Psychiatric/Behavioral: Negative.           Objective:     Physical Exam  Constitutional:       Appearance: She is well-developed.   HENT:      Head: Normocephalic.   Eyes:      Pupils: Pupils are equal, round, and reactive to light.   Cardiovascular:      Rate and Rhythm: Normal rate.   Pulmonary:      Effort: Pulmonary effort is normal.   Musculoskeletal:         General: Normal range of motion.      Cervical back: Normal range of motion and neck supple.   Skin:     General: Skin is warm and dry.   Neurological:      Mental Status: She is alert and oriented to person, place, and time.   Psychiatric:         Mood and Affect: Mood normal.         Behavior: Behavior normal.         Thought Content: Thought content normal.         Judgment: Judgment normal.

## 2025-01-30 ENCOUNTER — TELEPHONE (OUTPATIENT)
Age: 55
End: 2025-01-30

## 2025-01-30 NOTE — TELEPHONE ENCOUNTER
Left voice mail for patient, account is not showing a balance.  Copay from ultrasound was  credited and applied as copay to office visit.  Bills may have crossed in mail prior to applied credit.  To call billing office in any further clarification needed.

## 2025-01-30 NOTE — TELEPHONE ENCOUNTER
Patient had called regarding a bill she had received for her 12/18/24 appointment. Patient was told that she would not have to pay a copay since she paid one on 12/17/24 when she came in for an ultrasound. There was no availability on 12/17 to go over the results of the ultrasound with Dr. Dubose so she was scheduled on 12/18/24 to go over the results. Please advise patient can be reached at [phone] and a message can be left she had said.

## 2025-02-28 ENCOUNTER — ANNUAL EXAM (OUTPATIENT)
Dept: OBGYN CLINIC | Facility: CLINIC | Age: 55
End: 2025-02-28
Payer: COMMERCIAL

## 2025-02-28 VITALS — BODY MASS INDEX: 24.21 KG/M2 | SYSTOLIC BLOOD PRESSURE: 122 MMHG | DIASTOLIC BLOOD PRESSURE: 70 MMHG | WEIGHT: 150 LBS

## 2025-02-28 DIAGNOSIS — Z12.31 ENCOUNTER FOR SCREENING MAMMOGRAM FOR BREAST CANCER: ICD-10-CM

## 2025-02-28 DIAGNOSIS — N95.2 ATROPHIC VAGINITIS: ICD-10-CM

## 2025-02-28 DIAGNOSIS — Z01.419 WOMEN'S ANNUAL ROUTINE GYNECOLOGICAL EXAMINATION: Primary | ICD-10-CM

## 2025-02-28 DIAGNOSIS — Z01.419 PAP SMEAR, AS PART OF ROUTINE GYNECOLOGICAL EXAMINATION: ICD-10-CM

## 2025-02-28 PROCEDURE — S0612 ANNUAL GYNECOLOGICAL EXAMINA: HCPCS | Performed by: OBSTETRICS & GYNECOLOGY

## 2025-02-28 PROCEDURE — G0476 HPV COMBO ASSAY CA SCREEN: HCPCS | Performed by: OBSTETRICS & GYNECOLOGY

## 2025-02-28 PROCEDURE — G0145 SCR C/V CYTO,THINLAYER,RESCR: HCPCS | Performed by: OBSTETRICS & GYNECOLOGY

## 2025-02-28 NOTE — PATIENT INSTRUCTIONS
Normal gynecological physical examination.  Self-breast examination stressed.  Mammogram ordered.  Discussed regular exercise, healthy diet, importance of vitamin D and calcium supplements.  Discussed importance of sun block use during periods of prolonged sun exposure.  Patient will be seen in 1 year for routine gynecologic and medical examination.  Patient will call office for any problems, concerns, or issues which may arise during the interim.    Will obtain a follow-up pelvic ultrasound to reevaluate the endometrial lining.

## 2025-02-28 NOTE — PROGRESS NOTES
Assessment/Plan:    No problem-specific Assessment & Plan notes found for this encounter.       Diagnoses and all orders for this visit:    Women's annual routine gynecological examination  -     Liquid-based pap, screening    Pap smear, as part of routine gynecological examination    Encounter for screening mammogram for breast cancer  -     Mammo screening bilateral w 3d and cad; Future    Atrophic vaginitis          Normal gynecological physical examination.  Self-breast examination stressed.  Mammogram ordered.  Discussed regular exercise, healthy diet, importance of vitamin D and calcium supplements.  Discussed importance of sun block use during periods of prolonged sun exposure.  Patient will be seen in 1 year for routine gynecologic and medical examination.  Patient will call office for any problems, concerns, or issues which may arise during the interim.     Will obtain a follow-up pelvic ultrasound to reevaluate the endometrial lining.     Subjective:          Lorrie Kauffman is a 54 year old  female with no significant past medical history who is presenting for her annual gynecologic exam.     Patient had an ultrasound in December, which showed an endometrial lining appropriate for a menstruating woman. Now that the patient has not menstruated since December, she is concerned that her endometrial lining is too thick.    She denies any hot flashes, night sweats, or vaginal dryness. She further denies any dysuria, pelvic pain, abnormal discharge, stool changes, bowel/bladder dysfunction, breast tenderness, lumps, skin changes.     She eats a well-balanced diet and stays relatively active.     Patient has a family history of breast cancer in her maternal aunt, as well as diabetes and heart disease.      Her last mammogram in  showed no abnormalities, BIRADS 1.     Last colonoscopy in  showed no abnormalities.     Last pap smear in  was negative.         Patient ID: Lorrie Kauffman is a 54  y.o. female who presents today for her annual gynecologic and medical examination    Menstrual status: perimenopausal. Last spotting was in December    Vasomotor symptoms: denies    Patient reports normal appetite    Patient reports normal bowel and bladder habits    Patient denies any significant pelvic or abdominal pain    Patient denies any headaches, chest pain, shortness of breath fever shakes or chills    Patient denies any COVID 19 symptoms including cough or loss of taste or smell    COVID vaccine status: patient is aware of covid protocols    Medical problems:overactive bladder    Colonoscopy status:last colonoscopy on 10/14/21 showed no abnormalities. Recommend to repeat in 7-10 years.     Mammogram status:last mammogram on 12/16/24 showed no abnormalities, BIRADS 1. Next one is ordered in the EMR. Continue self breast examinations.     The following portions of the patient's history were reviewed and updated as appropriate: allergies, current medications, past family history, past medical history, past social history, past surgical history and problem list.    Review of Systems   Constitutional: Negative.  Negative for appetite change, diaphoresis, fatigue, fever and unexpected weight change.   HENT: Negative.     Eyes: Negative.    Respiratory: Negative.     Cardiovascular: Negative.    Gastrointestinal: Negative.  Negative for abdominal pain, blood in stool, constipation, diarrhea, nausea and vomiting.   Endocrine: Negative.  Negative for cold intolerance and heat intolerance.   Genitourinary: Negative.  Negative for dysuria, frequency, hematuria, urgency, vaginal bleeding, vaginal discharge and vaginal pain.   Musculoskeletal: Negative.    Skin: Negative.    Allergic/Immunologic: Negative.    Neurological: Negative.    Hematological: Negative.  Negative for adenopathy.   Psychiatric/Behavioral: Negative.           Objective:      /70   Wt 68 kg (150 lb)   BMI 24.21 kg/m²          Physical  Exam  Constitutional:       General: She is not in acute distress.     Appearance: Normal appearance. She is well-developed. She is not diaphoretic.   HENT:      Head: Normocephalic and atraumatic.   Eyes:      Pupils: Pupils are equal, round, and reactive to light.   Cardiovascular:      Rate and Rhythm: Normal rate and regular rhythm.      Heart sounds: Normal heart sounds. No murmur heard.     No friction rub. No gallop.   Pulmonary:      Effort: Pulmonary effort is normal.      Breath sounds: Normal breath sounds.   Chest:   Breasts:     Breasts are symmetrical.      Right: No inverted nipple, mass, nipple discharge, skin change or tenderness.      Left: No inverted nipple, mass, nipple discharge, skin change or tenderness.   Abdominal:      General: Bowel sounds are normal.      Palpations: Abdomen is soft.   Genitourinary:     General: Normal vulva.      Exam position: Supine.      Labia:         Right: No rash or lesion.         Left: No rash or lesion.       Vagina: Normal. No vaginal discharge, erythema, tenderness or bleeding.      Cervix: No discharge or friability.      Uterus: Not enlarged and not tender.       Adnexa:         Right: No mass, tenderness or fullness.          Left: No mass, tenderness or fullness.        Rectum: Normal. Guaiac result negative.      Comments: Mild atrophic vaginitis. Good pelvic support confirmed.   Musculoskeletal:         General: Normal range of motion.      Cervical back: Normal range of motion and neck supple.   Lymphadenopathy:      Cervical: No cervical adenopathy.      Upper Body:      Right upper body: No supraclavicular adenopathy.      Left upper body: No supraclavicular adenopathy.   Skin:     General: Skin is warm and dry.      Findings: No rash.   Neurological:      General: No focal deficit present.      Mental Status: She is alert and oriented to person, place, and time.   Psychiatric:         Mood and Affect: Mood normal.         Speech: Speech normal.          Behavior: Behavior normal.         Thought Content: Thought content normal.         Judgment: Judgment normal.

## 2025-03-07 LAB
LAB AP GYN PRIMARY INTERPRETATION: NORMAL
Lab: NORMAL

## 2025-04-07 NOTE — PROGRESS NOTES
AMB US Pelvic Non OB    Date/Time: 4/8/2025 8:30 AM    Performed by: Anne-Marie Moise  Authorized by: Jose Daniel Dubose MD  Universal Protocol:  Consent: Verbal consent obtained.  Consent given by: patient  Patient understanding: patient states understanding of the procedure being performed  Patient identity confirmed: verbally with patient    Procedure details:     SIS Procedure: No    Technique:  Transvaginal US, Non-OB    Position: lithotomy exam    Uterine findings:     Length (cm): 10.37    Height (cm):  5.09    Width (cm):  7.33    Endometrial stripe: identified      Endometrium thickness (mm):  4.83  Left ovary findings:     Left ovary:  Visualized    Length (cm): 2.7    Height (cm): 1.78    Width (cm): 2.01  Right ovary findings:     Right ovary:  Visualized    Length (cm): 2.98    Height (cm): 1.93    Width (cm): 2.27  Other findings:     Free pelvic fluid: not identified      Free peritoneal fluid: not identified    Post-Procedure Details:     Impression:  Anteverted uterus is inhomogeneous throughout without fibroids suggestive of adenomyosis. The bilateral ovaries appear within normal limits.     Tolerance:  Tolerated well, no immediate complications    Complications: no complications    Additional Procedure Comments:      GE VolusonP8 RIC5-9A-[id number] transvaginal transducer Serial #[id number] was used to perform the examination today and subsequently followed with high level disinfection utilizing Trophon EPR procedure.     Ultrasound performed at:     [facility]  [address] [facility]  [address] [place] [address]  Phone  [phone]  Fax  [phone]

## 2025-04-08 ENCOUNTER — ULTRASOUND (OUTPATIENT)
Dept: OBGYN CLINIC | Facility: CLINIC | Age: 55
End: 2025-04-08
Payer: COMMERCIAL

## 2025-04-08 ENCOUNTER — OFFICE VISIT (OUTPATIENT)
Dept: OBGYN CLINIC | Facility: CLINIC | Age: 55
End: 2025-04-08
Payer: COMMERCIAL

## 2025-04-08 VITALS — SYSTOLIC BLOOD PRESSURE: 120 MMHG | WEIGHT: 150.6 LBS | DIASTOLIC BLOOD PRESSURE: 80 MMHG | BODY MASS INDEX: 24.31 KG/M2

## 2025-04-08 DIAGNOSIS — Z71.2 ENCOUNTER TO DISCUSS TEST RESULTS: Primary | ICD-10-CM

## 2025-04-08 DIAGNOSIS — R93.89 ENDOMETRIAL THICKENING ON ULTRASOUND: Primary | ICD-10-CM

## 2025-04-08 PROCEDURE — 76830 TRANSVAGINAL US NON-OB: CPT | Performed by: OBSTETRICS & GYNECOLOGY

## 2025-04-08 PROCEDURE — 99213 OFFICE O/P EST LOW 20 MIN: CPT | Performed by: OBSTETRICS & GYNECOLOGY

## 2025-04-08 NOTE — PATIENT INSTRUCTIONS
Topic: Discussion of pelvic ultrasound results.    Pelvic ultrasound revealed an endometrial stripe which had decreased from 6.8 to 4.8 mm.  Patient was reassured of the findings.    Remainder the pelvic ultrasound was unremarkable.    All questions were answered for her during today's visit    We decided that future treatment planning will include a pelvic ultrasound approximately 4 months to check for stability of the endometrial stripe    Patient has always was told to feel free to call for any problems, questions, issues or concerns which may arise for her

## 2025-04-08 NOTE — PROGRESS NOTES
:  Assessment & Plan  Encounter to discuss test results             History of Present Illness     Lorrie Kauffman is a 54 y.o. female   Patient is a 54-year-old menopausal patient who presents today for follow-up pelvic ultrasound to evaluate the endometrial stripe.    Has not experienced any vaginal bleeding in several months and her last endometrial stripe evaluation in December revealed a value of 6.8 mm.    Today pelvic ultrasound revealed normal uterus with endometrial stripe of 4.8 mm.  As expected, it is decreasing in amount.    Remainder the pelvic ultrasound was unremarkable for any significant findings as well.  Ovaries were normal in size shape and appearance and Doppler flow was normal as well.    Patient was reassured of the findings on today's ultrasound.    She is not experiencing any significant vasomotor symptoms requiring intervention at this time.    After thorough discussion, plan was made to follow-up with another pelvic ultrasound in approximately 4 months to check for stability of the endometrial stripe.    All questions were answered for patient during today's visit    As always, patient was told to feel free to call for any problems, questions, issues or concerns which may arise for her.      Total time of today's visit was 20 minutes of which greater than 50% was spent face-to-face counseling the patient as well as coordination of care, review of chart and lab values, physical examination as well as computer entry into the [facility] medical record system.            Review of Systems   Constitutional: Negative.  Negative for appetite change, diaphoresis, fatigue, fever and unexpected weight change.   HENT: Negative.     Eyes: Negative.    Respiratory: Negative.     Cardiovascular: Negative.    Gastrointestinal: Negative.  Negative for abdominal pain, blood in stool, constipation, diarrhea, nausea and vomiting.   Endocrine: Negative.  Negative for cold intolerance and heat intolerance.    Genitourinary: Negative.  Negative for dysuria, frequency, hematuria, urgency, vaginal bleeding, vaginal discharge and vaginal pain.   Musculoskeletal: Negative.    Skin: Negative.    Allergic/Immunologic: Negative.    Neurological: Negative.    Hematological: Negative.  Negative for adenopathy.   Psychiatric/Behavioral: Negative.       Objective   /80   Wt 68.3 kg (150 lb 9.6 oz)   BMI 24.31 kg/m²      Physical Exam  Constitutional:       Appearance: Normal appearance.   HENT:      Head: Normocephalic and atraumatic.   Eyes:      Pupils: Pupils are equal, round, and reactive to light.   Cardiovascular:      Rate and Rhythm: Normal rate.   Pulmonary:      Effort: Pulmonary effort is normal.   Musculoskeletal:         General: Normal range of motion.      Cervical back: Normal range of motion and neck supple.   Skin:     General: Skin is warm and dry.   Neurological:      General: No focal deficit present.      Mental Status: She is alert and oriented to person, place, and time.   Psychiatric:         Mood and Affect: Mood normal.         Behavior: Behavior normal.         Thought Content: Thought content normal.         Judgment: Judgment normal.

## 2025-04-15 NOTE — PROGRESS NOTES
Pelvic ultrasound results reviewed and endometrial stripe was noted to be 4.83 mm.  No other significant findings were appreciated    Results discussed with patient and we discussed treatment planning    Will have her back in approximately 4 months to ensure stability of the endometrial stripe    All questions were answered for her    Patient to call for any problems, questions, issues or concerns which arise for her

## 2025-08-12 ENCOUNTER — ULTRASOUND (OUTPATIENT)
Dept: OBGYN CLINIC | Facility: CLINIC | Age: 55
End: 2025-08-12
Payer: COMMERCIAL

## 2025-08-12 ENCOUNTER — OFFICE VISIT (OUTPATIENT)
Dept: OBGYN CLINIC | Facility: CLINIC | Age: 55
End: 2025-08-12
Payer: COMMERCIAL